# Patient Record
Sex: FEMALE | Race: BLACK OR AFRICAN AMERICAN | Employment: FULL TIME | URBAN - METROPOLITAN AREA
[De-identification: names, ages, dates, MRNs, and addresses within clinical notes are randomized per-mention and may not be internally consistent; named-entity substitution may affect disease eponyms.]

---

## 2017-01-11 ENCOUNTER — TELEPHONE (OUTPATIENT)
Dept: INTERNAL MEDICINE CLINIC | Facility: CLINIC | Age: 57
End: 2017-01-11

## 2017-01-11 RX ORDER — AZITHROMYCIN 250 MG/1
TABLET, FILM COATED ORAL
Qty: 6 TABLET | Refills: 0 | Status: SHIPPED | OUTPATIENT
Start: 2017-01-11 | End: 2017-11-11 | Stop reason: ALTCHOICE

## 2017-01-11 NOTE — TELEPHONE ENCOUNTER
Pt called in c/o sinus congestion, drainage with clear fluid and dry cough since Monday. Pt denies being light headed/dizzy/fever/ear or sinus pressure/ST/N/V/SOB or wheezing. Pt looking for Z-stefan. Allergies to ace inhibitors and beta blockers.  Usi

## 2017-01-11 NOTE — TELEPHONE ENCOUNTER
I spoke with  via telephone and provided him with pts s/s. Per TO from , RX pt z-stefan 250 mg take 2 tab day 1, then 1 tab daily for 4 days # 6. TORB. Prescription sent to pharmacy.

## 2017-01-20 RX ORDER — ASPIRIN 81 MG
TABLET, DELAYED RELEASE (ENTERIC COATED) ORAL
Qty: 90 TABLET | Refills: 1 | Status: SHIPPED | OUTPATIENT
Start: 2017-01-20 | End: 2017-07-15

## 2017-02-06 RX ORDER — METOPROLOL SUCCINATE 100 MG/1
TABLET, EXTENDED RELEASE ORAL
Qty: 90 TABLET | Refills: 0 | Status: SHIPPED | OUTPATIENT
Start: 2017-02-06 | End: 2017-05-04

## 2017-02-06 RX ORDER — AMLODIPINE BESYLATE 10 MG/1
TABLET ORAL
Qty: 90 TABLET | Refills: 0 | Status: SHIPPED | OUTPATIENT
Start: 2017-02-06 | End: 2017-05-04

## 2017-04-17 RX ORDER — LOSARTAN POTASSIUM 50 MG/1
TABLET ORAL
Qty: 180 TABLET | Refills: 0 | Status: SHIPPED | OUTPATIENT
Start: 2017-04-17 | End: 2017-07-15

## 2017-05-05 RX ORDER — METOPROLOL SUCCINATE 100 MG/1
TABLET, EXTENDED RELEASE ORAL
Qty: 90 TABLET | Refills: 0 | Status: SHIPPED | OUTPATIENT
Start: 2017-05-05 | End: 2017-08-05

## 2017-05-05 RX ORDER — AMLODIPINE BESYLATE 10 MG/1
TABLET ORAL
Qty: 90 TABLET | Refills: 0 | Status: SHIPPED | OUTPATIENT
Start: 2017-05-05 | End: 2017-08-05

## 2017-07-01 NOTE — TELEPHONE ENCOUNTER
Pt does not meet protocol due to    HgBA1C procedure resulted in past 6 months    Last HgBA1C < 7.5       Medication pending, ok to refill?

## 2017-07-17 RX ORDER — ASPIRIN 81 MG
TABLET, DELAYED RELEASE (ENTERIC COATED) ORAL
Qty: 90 TABLET | Refills: 0 | Status: SHIPPED | OUTPATIENT
Start: 2017-07-17 | End: 2017-12-05

## 2017-07-17 RX ORDER — LOSARTAN POTASSIUM 50 MG/1
TABLET ORAL
Qty: 60 TABLET | Refills: 0 | Status: SHIPPED | OUTPATIENT
Start: 2017-07-17 | End: 2017-07-17

## 2017-07-17 RX ORDER — ATORVASTATIN CALCIUM 20 MG/1
TABLET, FILM COATED ORAL
Qty: 90 TABLET | Refills: 0 | Status: SHIPPED | OUTPATIENT
Start: 2017-07-17 | End: 2017-11-16

## 2017-07-17 RX ORDER — LOSARTAN POTASSIUM 50 MG/1
TABLET ORAL
Qty: 180 TABLET | Refills: 0 | Status: SHIPPED | OUTPATIENT
Start: 2017-07-17 | End: 2017-10-05

## 2017-07-17 NOTE — TELEPHONE ENCOUNTER
Medication doesn't fall under protocol. Approve if needed.       Last OV 12/3/16    Last refill 1/20/17 , 90 tablets 1 refill

## 2017-07-17 NOTE — TELEPHONE ENCOUNTER
Pt has no current number to reach pt for appt. Pt does need appt for refill per protocol.      Last OV: 12/3/16

## 2017-08-07 RX ORDER — METOPROLOL SUCCINATE 100 MG/1
TABLET, EXTENDED RELEASE ORAL
Qty: 90 TABLET | Refills: 0 | Status: SHIPPED | OUTPATIENT
Start: 2017-08-07 | End: 2017-11-04

## 2017-08-07 RX ORDER — AMLODIPINE BESYLATE 10 MG/1
TABLET ORAL
Qty: 90 TABLET | Refills: 0 | Status: SHIPPED | OUTPATIENT
Start: 2017-08-07 | End: 2017-11-04

## 2017-10-07 RX ORDER — LOSARTAN POTASSIUM 50 MG/1
TABLET ORAL
Qty: 180 TABLET | Refills: 0 | Status: SHIPPED | OUTPATIENT
Start: 2017-10-07 | End: 2018-01-26

## 2017-10-12 ENCOUNTER — TELEPHONE (OUTPATIENT)
Dept: INTERNAL MEDICINE CLINIC | Facility: CLINIC | Age: 57
End: 2017-10-12

## 2017-11-07 RX ORDER — METOPROLOL SUCCINATE 100 MG/1
TABLET, EXTENDED RELEASE ORAL
Qty: 30 TABLET | Refills: 0 | Status: SHIPPED | OUTPATIENT
Start: 2017-11-07 | End: 2017-11-11

## 2017-11-07 RX ORDER — AMLODIPINE BESYLATE 10 MG/1
TABLET ORAL
Qty: 30 TABLET | Refills: 0 | Status: SHIPPED | OUTPATIENT
Start: 2017-11-07 | End: 2017-11-11

## 2017-11-07 NOTE — TELEPHONE ENCOUNTER
Requesting:Amlopipine Besylate 10 mg oral Tab, Metoprolol Succinate  MG Oral Tablet 24 Hr  LOV: 12/03/2016  RTC: No future appointments.   Last Relevant Labs: 12/03/16  Filled:   METOPROLOL SUCCINATE  MG Oral Tablet 24 Hr 90 tablet 0 8/7/2017

## 2017-11-09 RX ORDER — METOPROLOL SUCCINATE 100 MG/1
TABLET, EXTENDED RELEASE ORAL
Qty: 90 TABLET | Refills: 0 | OUTPATIENT
Start: 2017-11-09

## 2017-11-09 RX ORDER — AMLODIPINE BESYLATE 10 MG/1
TABLET ORAL
Qty: 90 TABLET | Refills: 0 | OUTPATIENT
Start: 2017-11-09

## 2017-11-09 NOTE — TELEPHONE ENCOUNTER
Requesting   LOV: 12/03/2016  RTC: 11/11/2016  Last Relevant Labs: 12/3/17  Filled:    AmLODIPine Besylate 10 MG Oral Tab 30 tablet 0 11/7/2017     Metoprolol Succinate  MG Oral Tablet 24 Hr 30 tablet 0 11/7/2017         Future Appointments  Date Time

## 2017-11-11 ENCOUNTER — OFFICE VISIT (OUTPATIENT)
Dept: INTERNAL MEDICINE CLINIC | Facility: CLINIC | Age: 57
End: 2017-11-11

## 2017-11-11 VITALS
WEIGHT: 180 LBS | BODY MASS INDEX: 29 KG/M2 | OXYGEN SATURATION: 97 % | DIASTOLIC BLOOD PRESSURE: 86 MMHG | HEART RATE: 68 BPM | SYSTOLIC BLOOD PRESSURE: 144 MMHG | TEMPERATURE: 99 F | RESPIRATION RATE: 16 BRPM

## 2017-11-11 DIAGNOSIS — Z00.00 LABORATORY EXAMINATION ORDERED AS PART OF A ROUTINE GENERAL MEDICAL EXAMINATION: ICD-10-CM

## 2017-11-11 DIAGNOSIS — I10 ESSENTIAL HYPERTENSION, BENIGN: Primary | Chronic | ICD-10-CM

## 2017-11-11 PROCEDURE — 90686 IIV4 VACC NO PRSV 0.5 ML IM: CPT | Performed by: INTERNAL MEDICINE

## 2017-11-11 PROCEDURE — 90471 IMMUNIZATION ADMIN: CPT | Performed by: INTERNAL MEDICINE

## 2017-11-11 PROCEDURE — 99213 OFFICE O/P EST LOW 20 MIN: CPT | Performed by: INTERNAL MEDICINE

## 2017-11-11 RX ORDER — AMLODIPINE BESYLATE 10 MG/1
TABLET ORAL
Qty: 90 TABLET | Refills: 1 | Status: SHIPPED | OUTPATIENT
Start: 2017-11-11 | End: 2018-06-08

## 2017-11-11 RX ORDER — METOPROLOL SUCCINATE 100 MG/1
TABLET, EXTENDED RELEASE ORAL
Qty: 90 TABLET | Refills: 1 | Status: SHIPPED | OUTPATIENT
Start: 2017-11-11 | End: 2018-06-08

## 2017-11-17 RX ORDER — ATORVASTATIN CALCIUM 20 MG/1
TABLET, FILM COATED ORAL
Qty: 90 TABLET | Refills: 0 | Status: SHIPPED | OUTPATIENT
Start: 2017-11-17 | End: 2018-03-03

## 2017-12-07 RX ORDER — ASPIRIN 81 MG
TABLET, DELAYED RELEASE (ENTERIC COATED) ORAL
Qty: 90 TABLET | Refills: 0 | Status: SHIPPED | OUTPATIENT
Start: 2017-12-07 | End: 2018-04-04

## 2017-12-07 NOTE — TELEPHONE ENCOUNTER
Medication(s) to Refill:   Pending Prescriptions Disp Refills    ASPIRIN EC LOW DOSE 81 MG Oral Tab EC [Pharmacy Med Name: ASPIRIN 81MG EC DR LOW DOSE TABLETS] 90 tablet 0     Sig: TAKE 1 TABLET BY MOUTH EVERY DAY           Last Time Medication was Filled:

## 2018-01-04 ENCOUNTER — TELEPHONE (OUTPATIENT)
Dept: INTERNAL MEDICINE CLINIC | Facility: CLINIC | Age: 58
End: 2018-01-04

## 2018-01-04 NOTE — TELEPHONE ENCOUNTER
Notified that she will need to be seen for eval.    Future Appointments  Date Time Provider Alexis To   1/5/2018 11:00 AM Tamara Alan MD [facility] [place]

## 2018-01-04 NOTE — TELEPHONE ENCOUNTER
Patient calling in requesting a refill for Cyclobenzaprine HCl (CYCLOBENZAPRINE) 5 MG Oral Tab. Patient has been experiencing back pain. Patient was also wondering she would be able to get a refill for [employer] as well.

## 2018-01-31 RX ORDER — LOSARTAN POTASSIUM 50 MG/1
TABLET ORAL
Qty: 180 TABLET | Refills: 0 | Status: SHIPPED | OUTPATIENT
Start: 2018-01-31 | End: 2018-04-30

## 2018-02-03 ENCOUNTER — OFFICE VISIT (OUTPATIENT)
Dept: INTERNAL MEDICINE CLINIC | Facility: CLINIC | Age: 58
End: 2018-02-03

## 2018-02-03 VITALS
TEMPERATURE: 99 F | RESPIRATION RATE: 16 BRPM | OXYGEN SATURATION: 95 % | HEART RATE: 74 BPM | DIASTOLIC BLOOD PRESSURE: 102 MMHG | SYSTOLIC BLOOD PRESSURE: 148 MMHG

## 2018-02-03 DIAGNOSIS — M54.41 CHRONIC RIGHT-SIDED LOW BACK PAIN WITH RIGHT-SIDED SCIATICA: ICD-10-CM

## 2018-02-03 DIAGNOSIS — E11.9 TYPE 2 DIABETES MELLITUS WITHOUT COMPLICATION, WITHOUT LONG-TERM CURRENT USE OF INSULIN (HCC): Chronic | ICD-10-CM

## 2018-02-03 DIAGNOSIS — Z00.00 LABORATORY EXAMINATION ORDERED AS PART OF A ROUTINE GENERAL MEDICAL EXAMINATION: ICD-10-CM

## 2018-02-03 DIAGNOSIS — G89.29 CHRONIC RIGHT-SIDED LOW BACK PAIN WITH RIGHT-SIDED SCIATICA: Primary | ICD-10-CM

## 2018-02-03 DIAGNOSIS — G89.29 CHRONIC RIGHT-SIDED LOW BACK PAIN WITH RIGHT-SIDED SCIATICA: ICD-10-CM

## 2018-02-03 DIAGNOSIS — M54.41 CHRONIC RIGHT-SIDED LOW BACK PAIN WITH RIGHT-SIDED SCIATICA: Primary | ICD-10-CM

## 2018-02-03 LAB
ALBUMIN SERPL-MCNC: 4 G/DL (ref 3.5–4.8)
ALP LIVER SERPL-CCNC: 88 U/L (ref 46–118)
ALT SERPL-CCNC: 41 U/L (ref 14–54)
AST SERPL-CCNC: 26 U/L (ref 15–41)
BASOPHILS # BLD AUTO: 0.05 X10(3) UL (ref 0–0.1)
BASOPHILS NFR BLD AUTO: 0.9 %
BILIRUB SERPL-MCNC: 0.8 MG/DL (ref 0.1–2)
BILIRUB UR QL STRIP.AUTO: NEGATIVE
BUN BLD-MCNC: 10 MG/DL (ref 8–20)
CALCIUM BLD-MCNC: 9 MG/DL (ref 8.3–10.3)
CHLORIDE: 107 MMOL/L (ref 101–111)
CHOLEST SMN-MCNC: 126 MG/DL (ref ?–200)
CO2: 25 MMOL/L (ref 22–32)
COLOR UR AUTO: YELLOW
CREAT BLD-MCNC: 0.79 MG/DL (ref 0.55–1.02)
CREAT UR-SCNC: 196 MG/DL
EOSINOPHIL # BLD AUTO: 0.3 X10(3) UL (ref 0–0.3)
EOSINOPHIL NFR BLD AUTO: 5.5 %
ERYTHROCYTE [DISTWIDTH] IN BLOOD BY AUTOMATED COUNT: 13.8 % (ref 11.5–16)
EST. AVERAGE GLUCOSE BLD GHB EST-MCNC: 134 MG/DL (ref 68–126)
GLUCOSE BLD-MCNC: 103 MG/DL (ref 70–99)
GLUCOSE UR STRIP.AUTO-MCNC: NEGATIVE MG/DL
HBA1C MFR BLD HPLC: 6.3 % (ref ?–5.7)
HCT VFR BLD AUTO: 41.6 % (ref 34–50)
HDLC SERPL-MCNC: 63 MG/DL (ref 45–?)
HDLC SERPL: 2 {RATIO} (ref ?–4.44)
HGB BLD-MCNC: 14.1 G/DL (ref 12–16)
IMMATURE GRANULOCYTE COUNT: 0 X10(3) UL (ref 0–1)
IMMATURE GRANULOCYTE RATIO %: 0 %
KETONES UR STRIP.AUTO-MCNC: NEGATIVE MG/DL
LDLC SERPL CALC-MCNC: 54 MG/DL (ref ?–130)
LEUKOCYTE ESTERASE UR QL STRIP.AUTO: NEGATIVE
LYMPHOCYTES # BLD AUTO: 2.33 X10(3) UL (ref 0.9–4)
LYMPHOCYTES NFR BLD AUTO: 42.5 %
M PROTEIN MFR SERPL ELPH: 8 G/DL (ref 6.1–8.3)
MCH RBC QN AUTO: 30.8 PG (ref 27–33.2)
MCHC RBC AUTO-ENTMCNC: 33.9 G/DL (ref 31–37)
MCV RBC AUTO: 90.8 FL (ref 81–100)
MICROALBUMIN UR-MCNC: 2.12 MG/DL
MICROALBUMIN/CREAT 24H UR-RTO: 10.8 UG/MG (ref ?–30)
MONOCYTES # BLD AUTO: 0.62 X10(3) UL (ref 0.1–0.6)
MONOCYTES NFR BLD AUTO: 11.3 %
NEUTROPHIL ABS PRELIM: 2.18 X10 (3) UL (ref 1.3–6.7)
NEUTROPHILS # BLD AUTO: 2.18 X10(3) UL (ref 1.3–6.7)
NEUTROPHILS NFR BLD AUTO: 39.8 %
NITRITE UR QL STRIP.AUTO: NEGATIVE
NONHDLC SERPL-MCNC: 63 MG/DL (ref ?–130)
PH UR STRIP.AUTO: 6 [PH] (ref 4.5–8)
PLATELET # BLD AUTO: 269 10(3)UL (ref 150–450)
POTASSIUM SERPL-SCNC: 3.7 MMOL/L (ref 3.6–5.1)
PROT UR STRIP.AUTO-MCNC: NEGATIVE MG/DL
RBC # BLD AUTO: 4.58 X10(6)UL (ref 3.8–5.1)
RBC UR QL AUTO: NEGATIVE
RED CELL DISTRIBUTION WIDTH-SD: 46.3 FL (ref 35.1–46.3)
SODIUM SERPL-SCNC: 140 MMOL/L (ref 136–144)
SP GR UR STRIP.AUTO: 1.01 (ref 1–1.03)
THYROXINE (T4): 8.6 UG/DL (ref 4.5–10.9)
TRIGL SERPL-MCNC: 47 MG/DL (ref ?–150)
TSI SER-ACNC: 0.89 MIU/ML (ref 0.35–5.5)
UROBILINOGEN UR STRIP.AUTO-MCNC: <2 MG/DL
VLDLC SERPL CALC-MCNC: 9 MG/DL (ref 5–40)
WBC # BLD AUTO: 5.5 X10(3) UL (ref 4–13)

## 2018-02-03 PROCEDURE — 83036 HEMOGLOBIN GLYCOSYLATED A1C: CPT | Performed by: INTERNAL MEDICINE

## 2018-02-03 PROCEDURE — 81003 URINALYSIS AUTO W/O SCOPE: CPT | Performed by: INTERNAL MEDICINE

## 2018-02-03 PROCEDURE — 82570 ASSAY OF URINE CREATININE: CPT | Performed by: INTERNAL MEDICINE

## 2018-02-03 PROCEDURE — 80050 GENERAL HEALTH PANEL: CPT | Performed by: INTERNAL MEDICINE

## 2018-02-03 PROCEDURE — 80061 LIPID PANEL: CPT | Performed by: INTERNAL MEDICINE

## 2018-02-03 PROCEDURE — 84436 ASSAY OF TOTAL THYROXINE: CPT | Performed by: INTERNAL MEDICINE

## 2018-02-03 PROCEDURE — 82043 UR ALBUMIN QUANTITATIVE: CPT | Performed by: INTERNAL MEDICINE

## 2018-02-03 PROCEDURE — 99213 OFFICE O/P EST LOW 20 MIN: CPT | Performed by: INTERNAL MEDICINE

## 2018-02-03 RX ORDER — CYCLOBENZAPRINE HCL 5 MG
5 TABLET ORAL 3 TIMES DAILY PRN
Qty: 30 TABLET | Refills: 0 | Status: SHIPPED | OUTPATIENT
Start: 2018-02-03 | End: 2018-02-13

## 2018-02-03 NOTE — PROGRESS NOTES
Mahesh De Santiago  1960 is a 65year old female. Patient presents with:  Back Pain      HPI:   C/o of low back pain for 4 weeks. Patient bent forward and started having some intense pain in the right low back.   Saw chiropractor had x-rays and abou scale 5/10  Increased by movt        REVIEW OF SYSTEMS:     General/Constitutional:   General no fatigue, no fever, no weakness, no weight loss or gain. Gastrointestinal:   Reflux no. Abdominal pain no. Appetite change no. Black stools no. Bloating no.  B Sodium 50 MG Oral Tab EC; Take 1 tablet (50 mg total) by mouth 2 (two) times daily. -     Cyclobenzaprine HCl 5 MG Oral Tab; Take 1 tablet (5 mg total) by mouth 3 (three) times daily as needed for Muscle spasms.     Laboratory examination ordered as part o

## 2018-02-06 NOTE — TELEPHONE ENCOUNTER
Diclofenac 50 mg 1 tab bid filled 2-3-18 60 with 1 refill     LOV 2-3-18     Requesting a 90 day supply

## 2018-02-16 ENCOUNTER — TELEPHONE (OUTPATIENT)
Dept: INTERNAL MEDICINE CLINIC | Facility: CLINIC | Age: 58
End: 2018-02-16

## 2018-02-16 NOTE — TELEPHONE ENCOUNTER
Patient calling to request a note for work.  Patient has not gone to work in 3 days and is requesting a note from Dr. Jing Ervin

## 2018-02-16 NOTE — TELEPHONE ENCOUNTER
Looking for note excusing her from work this whole week d/t back pain. Stated that she has not been in 2/12/18 and will return 2/20/18. Ok to issue or will you need to see her?

## 2018-03-03 ENCOUNTER — OFFICE VISIT (OUTPATIENT)
Dept: INTERNAL MEDICINE CLINIC | Facility: CLINIC | Age: 58
End: 2018-03-03

## 2018-03-03 ENCOUNTER — HOSPITAL ENCOUNTER (OUTPATIENT)
Dept: MAMMOGRAPHY | Age: 58
Discharge: HOME | End: 2018-03-03
Attending: INTERNAL MEDICINE
Payer: COMMERCIAL

## 2018-03-03 VITALS
HEART RATE: 70 BPM | DIASTOLIC BLOOD PRESSURE: 80 MMHG | SYSTOLIC BLOOD PRESSURE: 136 MMHG | TEMPERATURE: 99 F | OXYGEN SATURATION: 93 % | HEIGHT: 66 IN | WEIGHT: 176.5 LBS | RESPIRATION RATE: 14 BRPM | BODY MASS INDEX: 28.37 KG/M2

## 2018-03-03 DIAGNOSIS — M54.50 CHRONIC MIDLINE LOW BACK PAIN WITHOUT SCIATICA: ICD-10-CM

## 2018-03-03 DIAGNOSIS — Z00.00 ROUTINE GENERAL MEDICAL EXAMINATION AT A HEALTH CARE FACILITY: Primary | ICD-10-CM

## 2018-03-03 DIAGNOSIS — Z12.31 ENCOUNTER FOR SCREENING MAMMOGRAM FOR MALIGNANT NEOPLASM OF BREAST: ICD-10-CM

## 2018-03-03 DIAGNOSIS — E11.9 TYPE 2 DIABETES MELLITUS WITHOUT COMPLICATION, WITHOUT LONG-TERM CURRENT USE OF INSULIN (HCC): Chronic | ICD-10-CM

## 2018-03-03 DIAGNOSIS — G89.29 CHRONIC MIDLINE LOW BACK PAIN WITHOUT SCIATICA: ICD-10-CM

## 2018-03-03 PROCEDURE — 99396 PREV VISIT EST AGE 40-64: CPT | Performed by: INTERNAL MEDICINE

## 2018-03-03 PROCEDURE — 93000 ELECTROCARDIOGRAM COMPLETE: CPT | Performed by: INTERNAL MEDICINE

## 2018-03-03 PROCEDURE — 77067 SCR MAMMO BI INCL CAD: CPT | Performed by: INTERNAL MEDICINE

## 2018-03-03 NOTE — PROGRESS NOTES
Tuan Charles  1960 is a 65year old female.     Patient presents with:  Physical: Est Pt. complete physical      HPI:   See below     Current Outpatient Prescriptions:  DICLOFENAC SODIUM 50 MG Oral Tab EC TAKE 1 TABLET(50 MG) BY MOUTH TWICE DAILY hoarseness. Neck no lumps, no goiter, no neck stiffness or pain. Endocrine:   Diabetes yes . Thyroid disorder none. Respiratory:   Patient denies chest pain, cough, WILL (dyspnea on exertion), chest congestion, blood-tinged sputum/wheezing.  Breathing no Turbinates: normal.   NECK:   Carotid bruit: none. Cervical lymph nodes: unremarkable. JVD: none. Range of Motion: normal.   Thyroid: unremarkable. HEART:   Clicks: absent . Edema: none visible . Heart sounds: normal.   Murmurs: none.    Rhyth smear results from gynecologist     The patient indicates understanding of these issues and agrees to the plan. The patient is asked to Return in about 4 weeks (around 3/31/2018) for result discussion.   Delia Soliman MD

## 2018-03-05 RX ORDER — ATORVASTATIN CALCIUM 20 MG/1
TABLET, FILM COATED ORAL
Qty: 90 TABLET | Refills: 0 | Status: SHIPPED | OUTPATIENT
Start: 2018-03-05 | End: 2018-06-08

## 2018-03-07 ENCOUNTER — TELEPHONE (OUTPATIENT)
Dept: INTERNAL MEDICINE CLINIC | Facility: CLINIC | Age: 58
End: 2018-03-07

## 2018-03-13 ENCOUNTER — OFFICE VISIT (OUTPATIENT)
Dept: INTERNAL MEDICINE CLINIC | Facility: CLINIC | Age: 58
End: 2018-03-13

## 2018-03-13 VITALS
HEART RATE: 90 BPM | TEMPERATURE: 100 F | SYSTOLIC BLOOD PRESSURE: 138 MMHG | OXYGEN SATURATION: 95 % | RESPIRATION RATE: 16 BRPM | DIASTOLIC BLOOD PRESSURE: 80 MMHG

## 2018-03-13 DIAGNOSIS — R05.9 COUGH: Primary | ICD-10-CM

## 2018-03-13 PROCEDURE — 99213 OFFICE O/P EST LOW 20 MIN: CPT | Performed by: INTERNAL MEDICINE

## 2018-03-13 RX ORDER — CODEINE PHOSPHATE AND GUAIFENESIN 10; 100 MG/5ML; MG/5ML
5 SOLUTION ORAL EVERY 6 HOURS PRN
Qty: 240 ML | Refills: 0 | Status: SHIPPED | OUTPATIENT
Start: 2018-03-13 | End: 2018-03-23

## 2018-03-13 RX ORDER — LEVOFLOXACIN 500 MG/1
500 TABLET, FILM COATED ORAL DAILY
Qty: 10 TABLET | Refills: 0 | Status: SHIPPED | OUTPATIENT
Start: 2018-03-13 | End: 2018-03-23

## 2018-03-13 RX ORDER — PREDNISONE 1 MG/1
TABLET ORAL
Qty: 60 TABLET | Refills: 0 | Status: SHIPPED | OUTPATIENT
Start: 2018-03-13 | End: 2018-04-17 | Stop reason: ALTCHOICE

## 2018-03-13 NOTE — PROGRESS NOTES
Daniella Ramirez  1960 is a 65year old female who presents for upper respiratory symptoms    Patient presents with:  Flu        HPI:   Pt reports  respiratory symptoms for few days nonproductive cough with wheezing.        Current Outpatient Prescrip fever  SKIN: no rashes  EYES:denies eye pain,discharge   HEENTneg  LUNGS: denies shortness of breath,chest pain,wheezing  CARDIOVASCULAR: denies chest pain on exertion  GI: no nausea or abdominal pain  NEURO: denies headaches    EXAM:   /80   Pulse 9

## 2018-03-24 ENCOUNTER — OFFICE VISIT (OUTPATIENT)
Dept: INTERNAL MEDICINE CLINIC | Facility: CLINIC | Age: 58
End: 2018-03-24

## 2018-03-24 VITALS
SYSTOLIC BLOOD PRESSURE: 152 MMHG | BODY MASS INDEX: 28 KG/M2 | RESPIRATION RATE: 16 BRPM | HEART RATE: 74 BPM | TEMPERATURE: 99 F | DIASTOLIC BLOOD PRESSURE: 98 MMHG | OXYGEN SATURATION: 98 % | WEIGHT: 171 LBS

## 2018-03-24 DIAGNOSIS — R05.9 COUGH: Primary | ICD-10-CM

## 2018-03-24 PROCEDURE — 99213 OFFICE O/P EST LOW 20 MIN: CPT | Performed by: INTERNAL MEDICINE

## 2018-03-24 NOTE — PROGRESS NOTES
Umm Median  1960 is a 65year old female who presents for upper respiratory symptoms    Patient presents with:   Follow - Up: sickness  Back Pain        HPI:   Better not 100% still has residual cough and a sore back from coughing      Current O breath,chest pain,wheezing  CARDIOVASCULAR: denies chest pain on exertion  GI: no nausea or abdominal pain  NEURO: denies headaches    EXAM:   /98   Pulse 74   Temp 98.7 °F (37.1 °C) (Oral)   Resp 16   Wt 171 lb   LMP 06/12/2015   SpO2 98%   BMI 27. 6

## 2018-04-05 RX ORDER — ASPIRIN 81 MG
TABLET, DELAYED RELEASE (ENTERIC COATED) ORAL
Qty: 90 TABLET | Refills: 0 | Status: SHIPPED | OUTPATIENT
Start: 2018-04-05 | End: 2018-12-29

## 2018-04-05 NOTE — TELEPHONE ENCOUNTER
ASPIRIN EC LOW DOSE 81 MG Oral Tab EC 90 tablet 0 12/7/2017    Sig :  TAKE 1 TABLET BY MOUTH EVERY DAY

## 2018-04-17 ENCOUNTER — OFFICE VISIT (OUTPATIENT)
Dept: INTERNAL MEDICINE CLINIC | Facility: CLINIC | Age: 58
End: 2018-04-17

## 2018-04-17 VITALS
OXYGEN SATURATION: 99 % | RESPIRATION RATE: 16 BRPM | SYSTOLIC BLOOD PRESSURE: 152 MMHG | DIASTOLIC BLOOD PRESSURE: 108 MMHG | HEART RATE: 78 BPM

## 2018-04-17 DIAGNOSIS — M54.41 CHRONIC RIGHT-SIDED LOW BACK PAIN WITH RIGHT-SIDED SCIATICA: ICD-10-CM

## 2018-04-17 DIAGNOSIS — G89.29 CHRONIC RIGHT-SIDED LOW BACK PAIN WITH RIGHT-SIDED SCIATICA: ICD-10-CM

## 2018-04-17 PROCEDURE — 99213 OFFICE O/P EST LOW 20 MIN: CPT | Performed by: INTERNAL MEDICINE

## 2018-04-17 RX ORDER — METHYLPREDNISOLONE 4 MG/1
TABLET ORAL
Qty: 1 KIT | Refills: 0 | Status: SHIPPED | OUTPATIENT
Start: 2018-04-17 | End: 2018-12-29 | Stop reason: ALTCHOICE

## 2018-04-17 NOTE — PROGRESS NOTES
Fadi Jernigan  1960 is a 65year old female. Patient presents with:  Pain: Starting at right hip down her leg      HPI:   C/o of low back pain for 4 weeks. Patient bent forward and started having some intense pain in the right low back.   Saw tristan Previous injury none. Previous surgery none. Previous therapy none. Bowel and bladder incontinence none.    Pain scale 5/10  Increased by movt    Saw chiropractor no relief no wants to proceed further    REVIEW OF SYSTEMS:     General/Constitutional for pain.     Diagnoses and all orders for this visit:    Chronic right-sided low back pain with right-sided sciatica  -     Diclofenac Sodium 50 MG Oral Tab EC; TAKE 1 TABLET(50 MG) BY MOUTH TWICE DAILY  -     methylPREDNISolone (MEDROL) 4 MG Oral Tablet T

## 2018-05-01 RX ORDER — LOSARTAN POTASSIUM 50 MG/1
TABLET ORAL
Qty: 180 TABLET | Refills: 0 | Status: SHIPPED | OUTPATIENT
Start: 2018-05-01 | End: 2018-08-16

## 2018-05-07 ENCOUNTER — TELEPHONE (OUTPATIENT)
Dept: INTERNAL MEDICINE CLINIC | Facility: CLINIC | Age: 58
End: 2018-05-07

## 2018-05-08 ENCOUNTER — TELEPHONE (OUTPATIENT)
Dept: INTERNAL MEDICINE CLINIC | Facility: CLINIC | Age: 58
End: 2018-05-08

## 2018-05-08 NOTE — TELEPHONE ENCOUNTER
Patient informed of orders, patient verbalized understanding but in a great deal of pain, would you squeeze her in your schedule today?

## 2018-05-08 NOTE — TELEPHONE ENCOUNTER
Let patient know that she has significant amount of spondylosis in the back   No evidence of disc herniation.   Patient to see me for further recommendations and description

## 2018-05-10 ENCOUNTER — OFFICE VISIT (OUTPATIENT)
Dept: INTERNAL MEDICINE CLINIC | Facility: CLINIC | Age: 58
End: 2018-05-10

## 2018-05-10 VITALS
RESPIRATION RATE: 14 BRPM | HEIGHT: 66 IN | HEART RATE: 76 BPM | DIASTOLIC BLOOD PRESSURE: 80 MMHG | WEIGHT: 170 LBS | BODY MASS INDEX: 27.32 KG/M2 | OXYGEN SATURATION: 98 % | SYSTOLIC BLOOD PRESSURE: 126 MMHG

## 2018-05-10 DIAGNOSIS — M47.26 OSTEOARTHRITIS OF SPINE WITH RADICULOPATHY, LUMBAR REGION: Primary | ICD-10-CM

## 2018-05-10 DIAGNOSIS — D25.9 UTERINE LEIOMYOMA, UNSPECIFIED LOCATION: ICD-10-CM

## 2018-05-10 PROCEDURE — 99213 OFFICE O/P EST LOW 20 MIN: CPT | Performed by: INTERNAL MEDICINE

## 2018-05-10 RX ORDER — TRAMADOL HYDROCHLORIDE 50 MG/1
50 TABLET ORAL EVERY 6 HOURS PRN
Qty: 50 TABLET | Refills: 0 | Status: SHIPPED | OUTPATIENT
Start: 2018-05-10 | End: 2018-05-15 | Stop reason: SINTOL

## 2018-05-10 NOTE — PATIENT INSTRUCTIONS
Patient informed that she may eventually need a pain consult however will leave that to the discretion of the spine MD

## 2018-05-10 NOTE — PROGRESS NOTES
Liz Fox  1960 is a 65year old female. No chief complaint on file.       For MRI results still continues to have back pain  Relief with medicines and exercise-none    Current Outpatient Prescriptions:  TraMADol HCl 50 MG Oral Tab Take 1 ta kg/m²     na      ASSESSMENT AND PLAN:   Diagnoses and all orders for this visit:    Osteoarthritis of spine with radiculopathy, lumbar region  -     ORTHOPEDIC - EXTERNAL  -     TraMADol HCl 50 MG Oral Tab;  Take 1 tablet (50 mg total) by mouth every 6 (si

## 2018-05-14 ENCOUNTER — TELEPHONE (OUTPATIENT)
Dept: INTERNAL MEDICINE CLINIC | Facility: CLINIC | Age: 58
End: 2018-05-14

## 2018-05-14 DIAGNOSIS — M54.41 CHRONIC RIGHT-SIDED LOW BACK PAIN WITH RIGHT-SIDED SCIATICA: ICD-10-CM

## 2018-05-14 DIAGNOSIS — M54.2 NECK PAIN: ICD-10-CM

## 2018-05-14 DIAGNOSIS — G89.29 CHRONIC RIGHT-SIDED LOW BACK PAIN WITH RIGHT-SIDED SCIATICA: ICD-10-CM

## 2018-05-14 RX ORDER — ETODOLAC 400 MG/1
400 TABLET, FILM COATED ORAL 2 TIMES DAILY
Qty: 60 TABLET | Refills: 0 | Status: CANCELLED | OUTPATIENT
Start: 2018-05-14 | End: 2018-05-29

## 2018-05-14 NOTE — TELEPHONE ENCOUNTER
Pt called w c/o on side effects to Tramadol 50mg, stated had vomiting, dizziness and sleepiness.  Pt also informed has upcoming ov w spine specialist for next week and requested a refill on Etodolac 400mg as this medication has worked for her on the past.

## 2018-05-30 PROBLEM — G89.29 CHRONIC BILATERAL LOW BACK PAIN WITH RIGHT-SIDED SCIATICA: Status: ACTIVE | Noted: 2018-05-30

## 2018-05-30 PROBLEM — M54.41 CHRONIC BILATERAL LOW BACK PAIN WITH RIGHT-SIDED SCIATICA: Status: ACTIVE | Noted: 2018-05-30

## 2018-06-08 RX ORDER — METOPROLOL SUCCINATE 100 MG/1
TABLET, EXTENDED RELEASE ORAL
Qty: 90 TABLET | Refills: 0 | Status: SHIPPED | OUTPATIENT
Start: 2018-06-08 | End: 2018-08-16

## 2018-06-08 RX ORDER — AMLODIPINE BESYLATE 10 MG/1
TABLET ORAL
Qty: 90 TABLET | Refills: 0 | Status: SHIPPED | OUTPATIENT
Start: 2018-06-08 | End: 2018-08-16

## 2018-06-08 RX ORDER — ATORVASTATIN CALCIUM 20 MG/1
TABLET, FILM COATED ORAL
Qty: 90 TABLET | Refills: 0 | Status: SHIPPED | OUTPATIENT
Start: 2018-06-08 | End: 2018-08-16

## 2018-08-15 DIAGNOSIS — E11.9 TYPE 2 DIABETES MELLITUS WITHOUT COMPLICATION, WITHOUT LONG-TERM CURRENT USE OF INSULIN (HCC): Primary | ICD-10-CM

## 2018-08-16 RX ORDER — METOPROLOL SUCCINATE 100 MG/1
100 TABLET, EXTENDED RELEASE ORAL
Qty: 90 TABLET | Refills: 0 | Status: SHIPPED | OUTPATIENT
Start: 2018-08-16 | End: 2018-12-20

## 2018-08-16 RX ORDER — AMLODIPINE BESYLATE 10 MG/1
10 TABLET ORAL
Qty: 90 TABLET | Refills: 0 | Status: SHIPPED | OUTPATIENT
Start: 2018-08-16 | End: 2018-12-20

## 2018-08-16 RX ORDER — ATORVASTATIN CALCIUM 20 MG/1
TABLET, FILM COATED ORAL
Qty: 90 TABLET | Refills: 0 | Status: SHIPPED | OUTPATIENT
Start: 2018-08-16 | End: 2018-12-29

## 2018-08-16 RX ORDER — LOSARTAN POTASSIUM 50 MG/1
100 TABLET ORAL
Qty: 180 TABLET | Refills: 0 | Status: SHIPPED | OUTPATIENT
Start: 2018-08-16 | End: 2018-12-29

## 2018-08-16 RX ORDER — ASPIRIN 81 MG
TABLET, DELAYED RELEASE (ENTERIC COATED) ORAL
Qty: 90 TABLET | Refills: 0 | Status: SHIPPED | OUTPATIENT
Start: 2018-08-16 | End: 2018-12-29

## 2018-08-16 NOTE — TELEPHONE ENCOUNTER
Medication(s) to Refill:   Pending Prescriptions Disp Refills    Metoprolol Succinate  MG Oral Tablet 24 Hr 90 tablet 0     Sig: Take 1 tablet (100 mg total) by mouth once daily.       Losartan Potassium 50 MG Oral Tab 180 tablet 0     Sig: Take 2 tab    Very high:         > or = 190 mg/dL      Resulting Agency  [facility]      Specimen Collected: 02/03/18 12:57 PM Last Resulted: 02/03/18  9:37 PM                Notes Recorded by Sara Louis MD on 2/5/2018 at 9:35 AM CST  Results in see me for cpx

## 2018-08-16 NOTE — TELEPHONE ENCOUNTER
Protocol failed - Patient due for HgBA1C - Orders pended and VM has been left for patient to contact office.        Medication(s) to Refill:   Pending Prescriptions Disp Refills    METFORMIN  MG Oral Tab [Pharmacy Med Name: METFORMIN 500MG TABLETS] 1

## 2018-09-24 ENCOUNTER — TELEPHONE (OUTPATIENT)
Dept: INTERNAL MEDICINE CLINIC | Facility: CLINIC | Age: 58
End: 2018-09-24

## 2018-10-15 ENCOUNTER — TELEPHONE (OUTPATIENT)
Dept: INTERNAL MEDICINE CLINIC | Facility: CLINIC | Age: 58
End: 2018-10-15

## 2018-10-15 NOTE — TELEPHONE ENCOUNTER
Spoke with Jemma Hardy - she is ok to do nurse visit/lab draw if available and if ok per Harsh Zamudio. Spoke with Harsh Zamudio - she is going to make sure all charges needed are done but is ok with lab draw at nurse visit. Notified Jemma Hardy.   Attempted to contact pt to disc

## 2018-10-15 NOTE — TELEPHONE ENCOUNTER
Patient coming in for OV tomorrow for nurse visit - flu shot. Requested 5:30 appt due to work schedule. Patient wondering if possible to get blood work done also as other lab hours do not work with her schedule.

## 2018-10-16 ENCOUNTER — TELEPHONE (OUTPATIENT)
Dept: INTERNAL MEDICINE CLINIC | Facility: CLINIC | Age: 58
End: 2018-10-16

## 2018-10-16 ENCOUNTER — IMMUNIZATION (OUTPATIENT)
Dept: INTERNAL MEDICINE CLINIC | Facility: CLINIC | Age: 58
End: 2018-10-16
Payer: COMMERCIAL

## 2018-10-16 DIAGNOSIS — Z23 NEED FOR VACCINATION: ICD-10-CM

## 2018-10-16 DIAGNOSIS — E11.9 TYPE 2 DIABETES MELLITUS WITHOUT COMPLICATION, WITHOUT LONG-TERM CURRENT USE OF INSULIN (HCC): Primary | ICD-10-CM

## 2018-10-16 PROCEDURE — 90686 IIV4 VACC NO PRSV 0.5 ML IM: CPT | Performed by: INTERNAL MEDICINE

## 2018-10-16 PROCEDURE — 90471 IMMUNIZATION ADMIN: CPT | Performed by: INTERNAL MEDICINE

## 2018-10-16 NOTE — TELEPHONE ENCOUNTER
Patient has Hemoglobin A1C lab ordered. Please advise if you would like pt to have any other labs checked when she comes in for lab draw.

## 2018-10-16 NOTE — TELEPHONE ENCOUNTER
Patient calling in inquiring about the lab work that she needs to get done. Pt is insure of which other labs she is needing. Nurse Kallie Avina will check with Physician and call pt back.

## 2018-11-20 ENCOUNTER — TELEPHONE (OUTPATIENT)
Dept: INTERNAL MEDICINE CLINIC | Facility: CLINIC | Age: 58
End: 2018-11-20

## 2018-11-20 RX ORDER — CODEINE PHOSPHATE AND GUAIFENESIN 10; 100 MG/5ML; MG/5ML
5 SOLUTION ORAL EVERY 6 HOURS PRN
Qty: 240 ML | Refills: 0 | Status: SHIPPED | OUTPATIENT
Start: 2018-11-20 | End: 2018-11-30

## 2018-11-20 RX ORDER — AMOXICILLIN AND CLAVULANATE POTASSIUM 500; 125 MG/1; MG/1
1 TABLET, FILM COATED ORAL 2 TIMES DAILY
Qty: 20 TABLET | Refills: 0 | Status: SHIPPED | OUTPATIENT
Start: 2018-11-20 | End: 2018-11-30

## 2018-11-20 NOTE — TELEPHONE ENCOUNTER
Patient calling in requesting a prescription for her symptoms of runny nose, cough, sore throat. Pt stated she is unable to make it in for an appointment. Please call pt to discuss symptoms.

## 2018-12-07 NOTE — TELEPHONE ENCOUNTER
Refill requested:   Requested Prescriptions     Pending Prescriptions Disp Refills   • METFORMIN  MG Oral Tab [Pharmacy Med Name: METFORMIN 500MG TABLETS] 180 tablet 0     Sig: TAKE 1 TABLET BY MOUTH TWICE DAILY   • AMLODIPINE BESYLATE 10 MG Oral Ta

## 2018-12-09 RX ORDER — METOPROLOL SUCCINATE 100 MG/1
TABLET, EXTENDED RELEASE ORAL
Qty: 90 TABLET | Refills: 0 | OUTPATIENT
Start: 2018-12-09

## 2018-12-09 RX ORDER — AMLODIPINE BESYLATE 10 MG/1
TABLET ORAL
Qty: 90 TABLET | Refills: 0 | OUTPATIENT
Start: 2018-12-09

## 2018-12-15 ENCOUNTER — TELEPHONE (OUTPATIENT)
Dept: INTERNAL MEDICINE CLINIC | Facility: CLINIC | Age: 58
End: 2018-12-15

## 2018-12-15 NOTE — TELEPHONE ENCOUNTER
Received call from pharmacy -- meds denied last week bc pt is due for labs & OV. Pt states she did blood work this morning. Approved 3 days of meds -- needs appt.

## 2018-12-20 RX ORDER — AMLODIPINE BESYLATE 10 MG/1
10 TABLET ORAL
Qty: 90 TABLET | Refills: 0 | Status: SHIPPED | OUTPATIENT
Start: 2018-12-20 | End: 2019-03-17

## 2018-12-20 RX ORDER — METOPROLOL SUCCINATE 100 MG/1
100 TABLET, EXTENDED RELEASE ORAL
Qty: 90 TABLET | Refills: 0 | Status: SHIPPED | OUTPATIENT
Start: 2018-12-20 | End: 2019-03-17

## 2018-12-20 NOTE — TELEPHONE ENCOUNTER
Patient calling in seeking her refill to be authorized. Pt scheduled an appointment on 12/29/18. Pt also completed her blood work, awaiting a call for her results.

## 2018-12-29 ENCOUNTER — OFFICE VISIT (OUTPATIENT)
Dept: INTERNAL MEDICINE CLINIC | Facility: CLINIC | Age: 58
End: 2018-12-29
Payer: COMMERCIAL

## 2018-12-29 VITALS
WEIGHT: 172.25 LBS | RESPIRATION RATE: 16 BRPM | BODY MASS INDEX: 28.35 KG/M2 | OXYGEN SATURATION: 97 % | DIASTOLIC BLOOD PRESSURE: 84 MMHG | TEMPERATURE: 98 F | HEIGHT: 65.5 IN | SYSTOLIC BLOOD PRESSURE: 130 MMHG | HEART RATE: 80 BPM

## 2018-12-29 DIAGNOSIS — R05.9 COUGH: Primary | ICD-10-CM

## 2018-12-29 PROCEDURE — 99213 OFFICE O/P EST LOW 20 MIN: CPT | Performed by: INTERNAL MEDICINE

## 2018-12-29 RX ORDER — LOSARTAN POTASSIUM 50 MG/1
100 TABLET ORAL DAILY
Qty: 180 TABLET | Refills: 0 | Status: SHIPPED | OUTPATIENT
Start: 2018-12-29 | End: 2019-04-13

## 2018-12-29 RX ORDER — ASPIRIN 81 MG/1
81 TABLET ORAL DAILY
Qty: 90 TABLET | Refills: 1 | Status: SHIPPED | OUTPATIENT
Start: 2018-12-29 | End: 2019-07-11

## 2018-12-29 RX ORDER — ALBUTEROL SULFATE 90 UG/1
2 AEROSOL, METERED RESPIRATORY (INHALATION) EVERY 6 HOURS PRN
Qty: 2 INHALER | Refills: 0 | Status: SHIPPED | OUTPATIENT
Start: 2018-12-29 | End: 2019-01-28

## 2018-12-29 RX ORDER — PREDNISONE 1 MG/1
TABLET ORAL
Qty: 36 TABLET | Refills: 0 | Status: SHIPPED | OUTPATIENT
Start: 2018-12-29 | End: 2019-01-14 | Stop reason: ALTCHOICE

## 2018-12-29 RX ORDER — ATORVASTATIN CALCIUM 20 MG/1
20 TABLET, FILM COATED ORAL NIGHTLY
Qty: 90 TABLET | Refills: 1 | Status: SHIPPED | OUTPATIENT
Start: 2018-12-29 | End: 2019-04-13

## 2018-12-29 RX ORDER — AZITHROMYCIN 250 MG/1
TABLET, FILM COATED ORAL
Qty: 6 TABLET | Refills: 0 | Status: SHIPPED | OUTPATIENT
Start: 2018-12-29 | End: 2019-01-14 | Stop reason: ALTCHOICE

## 2018-12-29 NOTE — PROGRESS NOTES
Javier Mendoza  1960 is a 63year old female who presents for upper respiratory symptoms    Patient presents with:  Medication Follow-Up  Cough    Here for blood pressure med refills per protocol.     HPI:   Pt reports  respiratory symptoms for prod otherwise. Denies fever  SKIN: no rashes  EYES:denies eye pain,discharge   HEENT:neg  LUNGS: denies shortness of breath,cough,expectoration,chest pain,wheezing  CARDIOVASCULAR: denies chest pain on exertion  GI: no nausea or abdominal pain  NEURO: denies he 6/29/2019). Inessa Funk MD

## 2019-01-01 NOTE — PROGRESS NOTES
Farhat Mccarthy  1960 is a 65year old female. Patient presents with: Follow - Up: Medication       HPI:   Received a phone call for refill of medicines  Patient has no complaints    Current Outpatient Prescriptions:   AmLODIPine Besylate 10 MG O Parents returned to room. Infant out to mom's room.    Nasal septum: midline. Pharynx: normal.   Sinuses: non-tender. HEART:   Clicks: no.   Distal Pulses Palpable: yes. Edema: none visible . Gallop: no .   Heart sounds: normal S1S2. Murmurs: none. Rhythm: regular.    LUNGS:   Airflow: normal air

## 2019-01-04 ENCOUNTER — TELEPHONE (OUTPATIENT)
Dept: INTERNAL MEDICINE CLINIC | Facility: CLINIC | Age: 59
End: 2019-01-04

## 2019-01-04 NOTE — TELEPHONE ENCOUNTER
Spoke with pharmacist and they stated prednisone taper that was sent over left out the 3 tabs for 2 days but it was instructed to the pt to do 3 tabs for 2 days so pt will be short 6 tablets.  Pharmacist is wanting provider to give clarification on what pt

## 2019-01-04 NOTE — TELEPHONE ENCOUNTER
Pharmacy would like a call back states that they gave the pt different instructions on medication from what the PCP sent over

## 2019-01-07 ENCOUNTER — TELEPHONE (OUTPATIENT)
Dept: INTERNAL MEDICINE CLINIC | Facility: CLINIC | Age: 59
End: 2019-01-07

## 2019-01-07 NOTE — TELEPHONE ENCOUNTER
Left message for patient to call office back.    -Pt willing to come in for OV or requesting additional medication?

## 2019-01-07 NOTE — TELEPHONE ENCOUNTER
Patient was in for OV on 12/29 for cough and symptoms have not improved. Solomon August \"Rattle in throat\" is ongoing, cough is no better.  Please advise

## 2019-01-07 NOTE — TELEPHONE ENCOUNTER
Notified pt of provider response. Attempted to schedule pt but she stated she needed something after 5 - offered OV tomorrow at 5pm. Pt stated that wouldn't work. Offered pt OV with another provider. Pt not agreeable.  Pt insistent on getting medication and

## 2019-01-07 NOTE — TELEPHONE ENCOUNTER
Pt stated that her symptoms are still lingering. Pt states she has a cough/wheeze which seems to get worse at night. Pt is taking her cheratussin q 6 hours and states it is helping with the cough at the time but not getting rid of it.  Pt also stated that s

## 2019-01-08 RX ORDER — AZITHROMYCIN 250 MG/1
TABLET, FILM COATED ORAL
Qty: 6 TABLET | Refills: 0 | Status: SHIPPED | OUTPATIENT
Start: 2019-01-08 | End: 2019-01-14 | Stop reason: ALTCHOICE

## 2019-01-14 ENCOUNTER — OFFICE VISIT (OUTPATIENT)
Dept: INTERNAL MEDICINE CLINIC | Facility: CLINIC | Age: 59
End: 2019-01-14
Payer: COMMERCIAL

## 2019-01-14 VITALS
WEIGHT: 176.25 LBS | RESPIRATION RATE: 16 BRPM | SYSTOLIC BLOOD PRESSURE: 144 MMHG | TEMPERATURE: 99 F | HEART RATE: 77 BPM | DIASTOLIC BLOOD PRESSURE: 84 MMHG | BODY MASS INDEX: 29.01 KG/M2 | HEIGHT: 65.5 IN | OXYGEN SATURATION: 96 %

## 2019-01-14 DIAGNOSIS — R05.9 COUGH: Primary | ICD-10-CM

## 2019-01-14 NOTE — PROGRESS NOTES
Fadi Jernigan  1960 is a 63year old female.     Patient presents with:  Cough      HPI:   Feels better wants to cancel the visit    Current Outpatient Medications:  aspirin (ASPIRIN EC LOW DOSE) 81 MG Oral Tab EC Take 1 tablet (81 mg total) by sergio the plan. The patient is asked to No Follow-up on file.   Laurent Arguello MD

## 2019-03-18 NOTE — TELEPHONE ENCOUNTER
Protocol failed - CMP or BMP in past 12 months    Medication(s) to Refill:   Requested Prescriptions     Pending Prescriptions Disp Refills   • AMLODIPINE BESYLATE 10 MG Oral Tab [Pharmacy Med Name: AMLODIPINE BESYLATE 10MG TABLETS] 90 tablet 0     Sig: TA

## 2019-03-19 ENCOUNTER — TELEPHONE (OUTPATIENT)
Dept: INTERNAL MEDICINE CLINIC | Facility: CLINIC | Age: 59
End: 2019-03-19

## 2019-03-19 DIAGNOSIS — Z00.00 LABORATORY EXAMINATION ORDERED AS PART OF A ROUTINE GENERAL MEDICAL EXAMINATION: Primary | ICD-10-CM

## 2019-03-19 DIAGNOSIS — E11.9 TYPE 2 DIABETES MELLITUS WITHOUT COMPLICATION, WITHOUT LONG-TERM CURRENT USE OF INSULIN (HCC): Chronic | ICD-10-CM

## 2019-03-19 DIAGNOSIS — I10 ESSENTIAL HYPERTENSION, BENIGN: Chronic | ICD-10-CM

## 2019-03-19 DIAGNOSIS — E78.00 PURE HYPERCHOLESTEROLEMIA: Chronic | ICD-10-CM

## 2019-03-19 RX ORDER — AMLODIPINE BESYLATE 10 MG/1
TABLET ORAL
Qty: 30 TABLET | Refills: 0 | Status: SHIPPED | OUTPATIENT
Start: 2019-03-19 | End: 2019-04-13

## 2019-03-19 RX ORDER — METOPROLOL SUCCINATE 100 MG/1
TABLET, EXTENDED RELEASE ORAL
Qty: 30 TABLET | Refills: 0 | Status: SHIPPED | OUTPATIENT
Start: 2019-03-19 | End: 2019-04-13

## 2019-03-19 NOTE — TELEPHONE ENCOUNTER
Future Appointments   Date Time Provider Alexis Zuleika   4/13/2019 10:00 AM Otf Briones MD [facility] [place]     Medication sent to pharmacy with enough to get patient to appointment.

## 2019-03-19 NOTE — TELEPHONE ENCOUNTER
Refill requested:   Requested Prescriptions     Pending Prescriptions Disp Refills   • Metoprolol Succinate  MG Oral Tablet 24 Hr 90 tablet 0     Sig: Take 1 tablet (100 mg total) by mouth once daily.    • amLODIPine Besylate 10 MG Oral Tab 90 tablet

## 2019-03-19 NOTE — TELEPHONE ENCOUNTER
First available Saturday CPX scheduled     Future Appointments   Date Time Provider Alexis Yadavi   4/13/2019 10:00 AM Virgil Contreras MD [facility]       Pt requests temporary supply of medication until visit:    Metoprolol    Amlodipine    Wa

## 2019-03-20 RX ORDER — METOPROLOL SUCCINATE 100 MG/1
100 TABLET, EXTENDED RELEASE ORAL
Qty: 90 TABLET | Refills: 0 | Status: SHIPPED | OUTPATIENT
Start: 2019-03-20 | End: 2019-03-20

## 2019-03-20 RX ORDER — AMLODIPINE BESYLATE 10 MG/1
10 TABLET ORAL
Qty: 90 TABLET | Refills: 0 | Status: SHIPPED | OUTPATIENT
Start: 2019-03-20 | End: 2019-03-20

## 2019-03-20 NOTE — TELEPHONE ENCOUNTER
Called pt to notify of provider request. No answer. LVM for pt to call office back.  -Fasting lab orders entered through [employer] and pt needs to go for them as well as schedule cpx.

## 2019-03-20 NOTE — TELEPHONE ENCOUNTER
Pt called back. Notified her of provider instructions. Pt verbalized understanding and stated she did not want to go to [address] [place] [address] stated [employer] is not in her insurance anymore. Lab orders updated and pt's preferred lab updated.  Med list updated and pharmacy

## 2019-04-02 NOTE — TELEPHONE ENCOUNTER
Metformin 500 mg 1 tab bid filled 12/20/18 180 with 0 refills     LOV 3/3/18     Return in about 4 weeks (around 3/31/2018) for result discussion.     Next apt 4/13/19    Labs 12/15/18    HEMOGLOBIN A1c <5.7 % of total Hgb 5.8 Abnormally high

## 2019-04-13 ENCOUNTER — OFFICE VISIT (OUTPATIENT)
Dept: INTERNAL MEDICINE CLINIC | Facility: CLINIC | Age: 59
End: 2019-04-13
Payer: COMMERCIAL

## 2019-04-13 VITALS
WEIGHT: 180.5 LBS | HEART RATE: 79 BPM | SYSTOLIC BLOOD PRESSURE: 132 MMHG | DIASTOLIC BLOOD PRESSURE: 88 MMHG | TEMPERATURE: 98 F | OXYGEN SATURATION: 98 % | RESPIRATION RATE: 12 BRPM | BODY MASS INDEX: 29.71 KG/M2 | HEIGHT: 65.5 IN

## 2019-04-13 DIAGNOSIS — E11.9 TYPE 2 DIABETES MELLITUS WITHOUT COMPLICATION, WITHOUT LONG-TERM CURRENT USE OF INSULIN (HCC): Chronic | ICD-10-CM

## 2019-04-13 DIAGNOSIS — Z00.00 LABORATORY EXAMINATION ORDERED AS PART OF A ROUTINE GENERAL MEDICAL EXAMINATION: ICD-10-CM

## 2019-04-13 DIAGNOSIS — I10 ESSENTIAL HYPERTENSION, BENIGN: Chronic | ICD-10-CM

## 2019-04-13 DIAGNOSIS — E78.00 PURE HYPERCHOLESTEROLEMIA: Chronic | ICD-10-CM

## 2019-04-13 DIAGNOSIS — M54.41 CHRONIC BILATERAL LOW BACK PAIN WITH RIGHT-SIDED SCIATICA: ICD-10-CM

## 2019-04-13 DIAGNOSIS — G89.29 CHRONIC BILATERAL LOW BACK PAIN WITH RIGHT-SIDED SCIATICA: ICD-10-CM

## 2019-04-13 DIAGNOSIS — Z00.00 ROUTINE GENERAL MEDICAL EXAMINATION AT A HEALTH CARE FACILITY: Primary | ICD-10-CM

## 2019-04-13 PROCEDURE — 82570 ASSAY OF URINE CREATININE: CPT | Performed by: INTERNAL MEDICINE

## 2019-04-13 PROCEDURE — 93000 ELECTROCARDIOGRAM COMPLETE: CPT | Performed by: INTERNAL MEDICINE

## 2019-04-13 PROCEDURE — 82043 UR ALBUMIN QUANTITATIVE: CPT | Performed by: INTERNAL MEDICINE

## 2019-04-13 PROCEDURE — 82306 VITAMIN D 25 HYDROXY: CPT | Performed by: INTERNAL MEDICINE

## 2019-04-13 PROCEDURE — 84436 ASSAY OF TOTAL THYROXINE: CPT | Performed by: INTERNAL MEDICINE

## 2019-04-13 PROCEDURE — 85025 COMPLETE CBC W/AUTO DIFF WBC: CPT | Performed by: INTERNAL MEDICINE

## 2019-04-13 PROCEDURE — 80061 LIPID PANEL: CPT | Performed by: INTERNAL MEDICINE

## 2019-04-13 PROCEDURE — 99396 PREV VISIT EST AGE 40-64: CPT | Performed by: INTERNAL MEDICINE

## 2019-04-13 PROCEDURE — 80053 COMPREHEN METABOLIC PANEL: CPT | Performed by: INTERNAL MEDICINE

## 2019-04-13 PROCEDURE — 83036 HEMOGLOBIN GLYCOSYLATED A1C: CPT | Performed by: INTERNAL MEDICINE

## 2019-04-13 PROCEDURE — 81003 URINALYSIS AUTO W/O SCOPE: CPT | Performed by: INTERNAL MEDICINE

## 2019-04-13 RX ORDER — LOSARTAN POTASSIUM 50 MG/1
100 TABLET ORAL DAILY
Qty: 180 TABLET | Refills: 1 | Status: SHIPPED | OUTPATIENT
Start: 2019-04-13 | End: 2019-10-26

## 2019-04-13 RX ORDER — AMLODIPINE BESYLATE 10 MG/1
TABLET ORAL
Qty: 90 TABLET | Refills: 1 | Status: SHIPPED | OUTPATIENT
Start: 2019-04-13 | End: 2019-10-13

## 2019-04-13 RX ORDER — METOPROLOL SUCCINATE 100 MG/1
TABLET, EXTENDED RELEASE ORAL
Qty: 90 TABLET | Refills: 1 | Status: SHIPPED | OUTPATIENT
Start: 2019-04-13 | End: 2019-10-13

## 2019-04-13 RX ORDER — ATORVASTATIN CALCIUM 20 MG/1
20 TABLET, FILM COATED ORAL NIGHTLY
Qty: 90 TABLET | Refills: 1 | Status: SHIPPED | OUTPATIENT
Start: 2019-04-13 | End: 2020-01-31

## 2019-04-13 NOTE — PROGRESS NOTES
Heather [facility] 1960 is a 63year old female.     Patient presents with:  Physical      HPI:   See below     Current Outpatient Medications:  amLODIPine Besylate 10 MG Oral Tab TAKE 1 TABLET(10 MG) BY MOUTH EVERY DAY Disp: 90 tablet Rfl: 1   Metopro Diabetes yes . Thyroid disorder none. Respiratory:   Patient denies chest pain, cough, WILL (dyspnea on exertion), chest congestion, blood-tinged sputum/wheezing. Breathing normal pattern .    Cardiovascular:   Patient denies chest pain, shortness of rolando unremarkable. JVD: none. Range of Motion: normal.   Thyroid: unremarkable. HEART:   Clicks: absent . Edema: none visible . Heart sounds: normal.   Murmurs: none. Rhythm: regular. LUNGS:   Auscultation: clear .    Chest Shape: normal .   Percus Succinate  MG Oral Tablet 24 Hr; TAKE 1 TABLET(100 MG) BY MOUTH EVERY DAY  -     Losartan Potassium 50 MG Oral Tab; Take 2 tablets (100 mg total) by mouth daily. -     atorvastatin 20 MG Oral Tab; Take 1 tablet (20 mg total) by mouth nightly.     EKG

## 2019-04-16 ENCOUNTER — TELEPHONE (OUTPATIENT)
Dept: INTERNAL MEDICINE CLINIC | Facility: CLINIC | Age: 59
End: 2019-04-16

## 2019-04-16 RX ORDER — ERGOCALCIFEROL 1.25 MG/1
50000 CAPSULE ORAL WEEKLY
Qty: 12 CAPSULE | Refills: 0 | Status: SHIPPED | OUTPATIENT
Start: 2019-04-16 | End: 2020-02-25 | Stop reason: ALTCHOICE

## 2019-04-16 NOTE — TELEPHONE ENCOUNTER
Notes recorded by NANCY Uriostegui on 4/16/2019 at 10:50 AM CDT  Vit D still low normal. Send 50,000 IU weekly x 12 weeks no refills. Then 2000 IU OTC VIt D3 daily.  DM still controlled but worse than previous, please encourage patient to watch diet

## 2019-06-10 ENCOUNTER — TELEPHONE (OUTPATIENT)
Dept: INTERNAL MEDICINE CLINIC | Facility: CLINIC | Age: 59
End: 2019-06-10

## 2019-06-10 DIAGNOSIS — G89.29 CHRONIC BILATERAL LOW BACK PAIN WITH RIGHT-SIDED SCIATICA: Primary | Chronic | ICD-10-CM

## 2019-06-10 DIAGNOSIS — M54.41 CHRONIC BILATERAL LOW BACK PAIN WITH RIGHT-SIDED SCIATICA: Primary | Chronic | ICD-10-CM

## 2019-06-10 RX ORDER — METHYLPREDNISOLONE 4 MG/1
TABLET ORAL
Qty: 1 KIT | Refills: 0 | Status: SHIPPED | OUTPATIENT
Start: 2019-06-10 | End: 2019-10-26 | Stop reason: ALTCHOICE

## 2019-06-10 NOTE — TELEPHONE ENCOUNTER
Referral pending for approval. Please advise. Pt also stating that her back pain is \"really bad\" right now. Pt stated she has tried the diclofenac and it works but [employer] so [employer]" to work.  Advised pt that no oral medication is going to have instant

## 2019-07-11 DIAGNOSIS — E11.9 TYPE 2 DIABETES MELLITUS WITHOUT COMPLICATION, WITHOUT LONG-TERM CURRENT USE OF INSULIN (HCC): Primary | Chronic | ICD-10-CM

## 2019-07-12 RX ORDER — ASPIRIN 81 MG/1
TABLET, COATED ORAL
Qty: 90 TABLET | Refills: 0 | Status: SHIPPED | OUTPATIENT
Start: 2019-07-12 | End: 2019-10-13

## 2019-07-12 NOTE — TELEPHONE ENCOUNTER
Protocol passed.      Medication(s) to Refill:   Requested Prescriptions     Pending Prescriptions Disp Refills   • ASPIRIN LOW DOSE 81 MG Oral Tab EC [Pharmacy Med Name: ASPIRIN 81MG EC LOW DOSE  TABLETS] 90 tablet 0     Sig: TAKE 1 TABLET BY MOUTH DAILY

## 2019-09-17 ENCOUNTER — HOSPITAL ENCOUNTER (OUTPATIENT)
Dept: MAMMOGRAPHY | Age: 59
Discharge: HOME | End: 2019-09-17
Attending: INTERNAL MEDICINE
Payer: COMMERCIAL

## 2019-09-17 DIAGNOSIS — Z00.00 ROUTINE GENERAL MEDICAL EXAMINATION AT A HEALTH CARE FACILITY: ICD-10-CM

## 2019-09-17 PROCEDURE — 77067 SCR MAMMO BI INCL CAD: CPT | Performed by: INTERNAL MEDICINE

## 2019-09-17 PROCEDURE — 77063 BREAST TOMOSYNTHESIS BI: CPT | Performed by: INTERNAL MEDICINE

## 2019-10-13 DIAGNOSIS — E11.9 TYPE 2 DIABETES MELLITUS WITHOUT COMPLICATION, WITHOUT LONG-TERM CURRENT USE OF INSULIN (HCC): Chronic | ICD-10-CM

## 2019-10-15 NOTE — TELEPHONE ENCOUNTER
Protocol failed - labs and appointment needed - LVM for pt to contact office to schedule appointment for BP follow up.      Medication(s) to Refill:   Requested Prescriptions     Pending Prescriptions Disp Refills   • ASPIRIN LOW DOSE 81 MG Oral Tab EC [Pha

## 2019-10-16 RX ORDER — METOPROLOL SUCCINATE 100 MG/1
TABLET, EXTENDED RELEASE ORAL
Qty: 30 TABLET | Refills: 0 | Status: SHIPPED | OUTPATIENT
Start: 2019-10-16 | End: 2019-10-26

## 2019-10-16 RX ORDER — ASPIRIN 81 MG/1
TABLET, COATED ORAL
Qty: 30 TABLET | Refills: 0 | Status: SHIPPED | OUTPATIENT
Start: 2019-10-16 | End: 2019-11-18

## 2019-10-16 RX ORDER — AMLODIPINE BESYLATE 10 MG/1
TABLET ORAL
Qty: 30 TABLET | Refills: 0 | Status: SHIPPED | OUTPATIENT
Start: 2019-10-16 | End: 2019-10-26

## 2019-10-16 NOTE — TELEPHONE ENCOUNTER
Patient called back and scheduled BP follow up for 10/26/19 at 12:45 p.m. with Dr. Melissa Packer. Patient is inquiring if she can get a temporary rx until she comes in for her appointment. Please call back to inform if this would be approved by Dr. Melissa Packer.

## 2019-10-16 NOTE — TELEPHONE ENCOUNTER
Pt is scheduled for an appt. On 10-26-19 with Dr. Regla Lawson. She is requesting short supply of medications. Gave only 30 day supply.

## 2019-10-22 RX ORDER — LOSARTAN POTASSIUM 50 MG/1
TABLET ORAL
Qty: 180 TABLET | Refills: 0 | Status: SHIPPED | OUTPATIENT
Start: 2019-10-22 | End: 2020-01-31

## 2019-10-22 RX ORDER — LOSARTAN POTASSIUM 50 MG/1
TABLET ORAL
Qty: 180 TABLET | Refills: 0 | OUTPATIENT
Start: 2019-10-22

## 2019-10-22 RX ORDER — ERGOCALCIFEROL 1.25 MG/1
CAPSULE ORAL
Qty: 12 CAPSULE | Refills: 0 | OUTPATIENT
Start: 2019-10-22

## 2019-10-22 NOTE — TELEPHONE ENCOUNTER
Passed protocol    Medication(s) to Refill:   Requested Prescriptions     Pending Prescriptions Disp Refills   • LOSARTAN POTASSIUM 50 MG Oral Tab [Pharmacy Med Name: LOSARTAN 50MG TABLETS] 180 tablet 0     Sig: TAKE 2 TABLETS(100 MG) BY MOUTH DAILY   • LO

## 2019-10-26 ENCOUNTER — OFFICE VISIT (OUTPATIENT)
Dept: INTERNAL MEDICINE CLINIC | Facility: CLINIC | Age: 59
End: 2019-10-26
Payer: COMMERCIAL

## 2019-10-26 VITALS
BODY MASS INDEX: 30.99 KG/M2 | HEART RATE: 78 BPM | TEMPERATURE: 98 F | WEIGHT: 188.25 LBS | HEIGHT: 65.5 IN | DIASTOLIC BLOOD PRESSURE: 78 MMHG | SYSTOLIC BLOOD PRESSURE: 120 MMHG

## 2019-10-26 DIAGNOSIS — E78.00 PURE HYPERCHOLESTEROLEMIA: Chronic | ICD-10-CM

## 2019-10-26 DIAGNOSIS — I10 ESSENTIAL HYPERTENSION, BENIGN: Primary | Chronic | ICD-10-CM

## 2019-10-26 DIAGNOSIS — E11.9 TYPE 2 DIABETES MELLITUS WITHOUT COMPLICATION, WITHOUT LONG-TERM CURRENT USE OF INSULIN (HCC): Chronic | ICD-10-CM

## 2019-10-26 PROCEDURE — 99213 OFFICE O/P EST LOW 20 MIN: CPT | Performed by: INTERNAL MEDICINE

## 2019-10-26 PROCEDURE — 90471 IMMUNIZATION ADMIN: CPT | Performed by: INTERNAL MEDICINE

## 2019-10-26 PROCEDURE — 90732 PPSV23 VACC 2 YRS+ SUBQ/IM: CPT | Performed by: INTERNAL MEDICINE

## 2019-10-26 RX ORDER — METOPROLOL SUCCINATE 100 MG/1
TABLET, EXTENDED RELEASE ORAL
Qty: 90 TABLET | Refills: 1 | Status: SHIPPED | OUTPATIENT
Start: 2019-10-26 | End: 2020-05-08

## 2019-10-26 RX ORDER — AMLODIPINE BESYLATE 10 MG/1
TABLET ORAL
Qty: 90 TABLET | Refills: 1 | Status: SHIPPED | OUTPATIENT
Start: 2019-10-26 | End: 2020-05-08

## 2019-10-26 NOTE — PROGRESS NOTES
Janak Matias  1960 is a 63year old female. Patient presents with:   Follow - Up: b/p       HPI:   To check no complaints  metFORMIN HCl 500 MG Oral Tab, TAKE 1 TABLET(500 MG) BY MOUTH TWICE DAILY, Disp: 180 tablet, Rfl: 1  amLODIPine Besylate Nasal septum: midline. Pharynx: normal.   Sinuses: non-tender. HEART:   Clicks: no.   Distal Pulses Palpable: yes. Edema: none visible . Gallop: no .   Heart sounds: normal S1S2. Murmurs: none. Rhythm: regular.    LUNGS:   Airflow: normal air

## 2019-11-12 ENCOUNTER — APPOINTMENT (OUTPATIENT)
Dept: LAB | Age: 59
End: 2019-11-12
Attending: INTERNAL MEDICINE
Payer: COMMERCIAL

## 2019-11-12 DIAGNOSIS — E11.9 TYPE 2 DIABETES MELLITUS WITHOUT COMPLICATION, WITHOUT LONG-TERM CURRENT USE OF INSULIN (HCC): Chronic | ICD-10-CM

## 2019-11-12 DIAGNOSIS — I10 ESSENTIAL HYPERTENSION, BENIGN: Chronic | ICD-10-CM

## 2019-11-12 DIAGNOSIS — E78.00 PURE HYPERCHOLESTEROLEMIA: Chronic | ICD-10-CM

## 2019-11-12 PROCEDURE — 36415 COLL VENOUS BLD VENIPUNCTURE: CPT | Performed by: INTERNAL MEDICINE

## 2019-11-12 PROCEDURE — 83036 HEMOGLOBIN GLYCOSYLATED A1C: CPT | Performed by: INTERNAL MEDICINE

## 2019-11-12 PROCEDURE — 80053 COMPREHEN METABOLIC PANEL: CPT | Performed by: INTERNAL MEDICINE

## 2019-11-12 PROCEDURE — 80061 LIPID PANEL: CPT | Performed by: INTERNAL MEDICINE

## 2019-11-18 RX ORDER — ASPIRIN 81 MG/1
TABLET, COATED ORAL
Qty: 30 TABLET | Refills: 0 | Status: CANCELLED | OUTPATIENT
Start: 2019-11-18

## 2019-11-19 RX ORDER — ASPIRIN 81 MG/1
TABLET, COATED ORAL
Qty: 30 TABLET | Refills: 0 | Status: SHIPPED | OUTPATIENT
Start: 2019-11-19 | End: 2020-02-03

## 2019-11-19 NOTE — TELEPHONE ENCOUNTER
Aspirin low 81 mg 1 tab daily filled 10/16/19 30 with 0 refills     LOV 10/26/19   Return in about 6 months (around 4/26/2020). .  No upcoming apt on file   Labs 11/12/19

## 2020-01-31 RX ORDER — ATORVASTATIN CALCIUM 20 MG/1
TABLET, FILM COATED ORAL
Qty: 90 TABLET | Refills: 1 | Status: SHIPPED | OUTPATIENT
Start: 2020-01-31 | End: 2020-08-11

## 2020-01-31 RX ORDER — LOSARTAN POTASSIUM 50 MG/1
TABLET ORAL
Qty: 180 TABLET | Refills: 0 | Status: SHIPPED | OUTPATIENT
Start: 2020-01-31 | End: 2020-05-08

## 2020-01-31 NOTE — TELEPHONE ENCOUNTER
Passed protocol    Requesting atorvastatin 20 MG Oral Tab  LOV: 10/26/19  RTC: 6 months  Last Relevant Labs: 11/12/19  Filled: 4/13/19 #90 with 1 refills    Requesting LOSARTAN POTASSIUM 50 MG Oral Tab  Filled: 10/22/19 #180 with 0 refills    No future suma

## 2020-02-03 RX ORDER — ASPIRIN 81 MG/1
TABLET, COATED ORAL
Qty: 30 TABLET | Refills: 0 | Status: SHIPPED | OUTPATIENT
Start: 2020-02-03 | End: 2020-04-03

## 2020-02-03 NOTE — TELEPHONE ENCOUNTER
No protocol    Requesting ASPIRIN LOW DOSE 81 MG Oral Tab EC  LOV: 10/26/19  RTC: 6 months  Last Relevant Labs: 11/12/19  Filled: 11/19/19 #30 with 0 refills    No future appointments.

## 2020-02-07 ENCOUNTER — TELEPHONE (OUTPATIENT)
Dept: INTERNAL MEDICINE CLINIC | Facility: CLINIC | Age: 60
End: 2020-02-07

## 2020-02-07 DIAGNOSIS — G89.29 CHRONIC BILATERAL LOW BACK PAIN WITH RIGHT-SIDED SCIATICA: Primary | Chronic | ICD-10-CM

## 2020-02-07 DIAGNOSIS — M54.41 CHRONIC BILATERAL LOW BACK PAIN WITH RIGHT-SIDED SCIATICA: Primary | Chronic | ICD-10-CM

## 2020-02-07 NOTE — TELEPHONE ENCOUNTER
Patient calling for an order to [address] [facility] for physical therapy in [place].  Lower back, states shes had it before and would like a call back letting her know

## 2020-02-07 NOTE — TELEPHONE ENCOUNTER
LOV 10/26/2019.     Referral pending for approval - please advise if willing to sign off or if pt needs to come in for OV first. TY

## 2020-02-12 ENCOUNTER — TELEPHONE (OUTPATIENT)
Dept: INTERNAL MEDICINE CLINIC | Facility: CLINIC | Age: 60
End: 2020-02-12

## 2020-02-12 NOTE — TELEPHONE ENCOUNTER
Patient called requesting note to be excused from work. From 2/10-2/13. Patient was referred to PT for ongoing back pain and feels no improvement.  Patient states that she has seen Dr. Ambar Craft for this issue before LOV 10/26/19

## 2020-02-13 NOTE — TELEPHONE ENCOUNTER
Note has been printed and signed - pt informed that letter is ready for  - pt expressed understanding.

## 2020-02-25 ENCOUNTER — OFFICE VISIT (OUTPATIENT)
Dept: INTERNAL MEDICINE CLINIC | Facility: CLINIC | Age: 60
End: 2020-02-25
Payer: COMMERCIAL

## 2020-02-25 ENCOUNTER — TELEPHONE (OUTPATIENT)
Dept: INTERNAL MEDICINE CLINIC | Facility: CLINIC | Age: 60
End: 2020-02-25

## 2020-02-25 VITALS
OXYGEN SATURATION: 98 % | TEMPERATURE: 99 F | WEIGHT: 186 LBS | BODY MASS INDEX: 30.62 KG/M2 | RESPIRATION RATE: 18 BRPM | DIASTOLIC BLOOD PRESSURE: 76 MMHG | SYSTOLIC BLOOD PRESSURE: 142 MMHG | HEIGHT: 65.5 IN | HEART RATE: 65 BPM

## 2020-02-25 DIAGNOSIS — M54.41 CHRONIC BILATERAL LOW BACK PAIN WITH RIGHT-SIDED SCIATICA: Primary | ICD-10-CM

## 2020-02-25 DIAGNOSIS — G89.29 CHRONIC BILATERAL LOW BACK PAIN WITH RIGHT-SIDED SCIATICA: Primary | ICD-10-CM

## 2020-02-25 PROCEDURE — 99213 OFFICE O/P EST LOW 20 MIN: CPT | Performed by: INTERNAL MEDICINE

## 2020-02-25 NOTE — PROGRESS NOTES
Drew Wright  1960 is a 63year old female. Patient presents with:  Complete Form: FMLA      HPI:   Patient went for physical therapy in early part of February and here for FMLA form to be filled out.   Current Outpatient Medications   Medicati

## 2020-02-25 NOTE — TELEPHONE ENCOUNTER
Patient here for OV for [facility] . Forms complete by Dr. Keyon Castaneda. Copy given to patient. Copy sent to scanning. Original placed in purple accordion file by [employer] pod. Faxed copy to [phone]    Awaiting confirmation of fax.

## 2020-04-03 RX ORDER — ASPIRIN 81 MG/1
TABLET, COATED ORAL
Qty: 30 TABLET | Refills: 0 | Status: SHIPPED | OUTPATIENT
Start: 2020-04-03

## 2020-05-08 RX ORDER — LOSARTAN POTASSIUM 50 MG/1
TABLET ORAL
Qty: 180 TABLET | Refills: 0 | Status: SHIPPED | OUTPATIENT
Start: 2020-05-08 | End: 2020-08-18

## 2020-05-08 RX ORDER — AMLODIPINE BESYLATE 10 MG/1
TABLET ORAL
Qty: 90 TABLET | Refills: 1 | Status: SHIPPED | OUTPATIENT
Start: 2020-05-08 | End: 2020-12-01

## 2020-05-08 RX ORDER — METOPROLOL SUCCINATE 100 MG/1
TABLET, EXTENDED RELEASE ORAL
Qty: 90 TABLET | Refills: 1 | Status: SHIPPED | OUTPATIENT
Start: 2020-05-08 | End: 2020-12-01

## 2020-05-31 DIAGNOSIS — E11.9 TYPE 2 DIABETES MELLITUS WITHOUT COMPLICATION, WITHOUT LONG-TERM CURRENT USE OF INSULIN (HCC): Chronic | ICD-10-CM

## 2020-06-01 NOTE — TELEPHONE ENCOUNTER
Refill requested:   Requested Prescriptions     Pending Prescriptions Disp Refills   • METFORMIN  MG Oral Tab [Pharmacy Med Name: METFORMIN 500MG TABLETS] 180 tablet 1     Sig: TAKE 1 TABLET(500 MG) BY MOUTH TWICE DAILY       Failed protocol    Last

## 2020-07-18 ENCOUNTER — OFFICE VISIT (OUTPATIENT)
Dept: INTERNAL MEDICINE CLINIC | Facility: CLINIC | Age: 60
End: 2020-07-18
Payer: COMMERCIAL

## 2020-07-18 VITALS
OXYGEN SATURATION: 97 % | DIASTOLIC BLOOD PRESSURE: 64 MMHG | TEMPERATURE: 98 F | BODY MASS INDEX: 29.14 KG/M2 | SYSTOLIC BLOOD PRESSURE: 124 MMHG | RESPIRATION RATE: 16 BRPM | WEIGHT: 177 LBS | HEIGHT: 65.5 IN | HEART RATE: 70 BPM

## 2020-07-18 DIAGNOSIS — G89.29 CHRONIC BILATERAL LOW BACK PAIN WITH RIGHT-SIDED SCIATICA: Primary | Chronic | ICD-10-CM

## 2020-07-18 DIAGNOSIS — M54.41 CHRONIC BILATERAL LOW BACK PAIN WITH RIGHT-SIDED SCIATICA: Primary | Chronic | ICD-10-CM

## 2020-07-18 DIAGNOSIS — Z00.00 LABORATORY EXAMINATION ORDERED AS PART OF A ROUTINE GENERAL MEDICAL EXAMINATION: ICD-10-CM

## 2020-07-18 LAB
ALBUMIN SERPL-MCNC: 3.9 G/DL (ref 3.4–5)
ALBUMIN/GLOB SERPL: 1 {RATIO} (ref 1–2)
ALP LIVER SERPL-CCNC: 97 U/L (ref 46–118)
ALT SERPL-CCNC: 36 U/L (ref 13–56)
ANION GAP SERPL CALC-SCNC: 6 MMOL/L (ref 0–18)
AST SERPL-CCNC: 23 U/L (ref 15–37)
BASOPHILS # BLD AUTO: 0.05 X10(3) UL (ref 0–0.2)
BASOPHILS NFR BLD AUTO: 0.9 %
BILIRUB SERPL-MCNC: 0.5 MG/DL (ref 0.1–2)
BILIRUB UR QL STRIP.AUTO: NEGATIVE
BUN BLD-MCNC: 12 MG/DL (ref 7–18)
BUN/CREAT SERPL: 12.2 (ref 10–20)
CALCIUM BLD-MCNC: 9.1 MG/DL (ref 8.5–10.1)
CHLORIDE SERPL-SCNC: 106 MMOL/L (ref 98–112)
CHOLEST SMN-MCNC: 136 MG/DL (ref ?–200)
CO2 SERPL-SCNC: 27 MMOL/L (ref 21–32)
COLOR UR AUTO: YELLOW
CREAT BLD-MCNC: 0.98 MG/DL (ref 0.55–1.02)
CREAT UR-SCNC: 258 MG/DL
DEPRECATED RDW RBC AUTO: 52.4 FL (ref 35.1–46.3)
EOSINOPHIL # BLD AUTO: 0.29 X10(3) UL (ref 0–0.7)
EOSINOPHIL NFR BLD AUTO: 5.1 %
ERYTHROCYTE [DISTWIDTH] IN BLOOD BY AUTOMATED COUNT: 14.6 % (ref 11–15)
EST. AVERAGE GLUCOSE BLD GHB EST-MCNC: 126 MG/DL (ref 68–126)
GLOBULIN PLAS-MCNC: 4 G/DL (ref 2.8–4.4)
GLUCOSE BLD-MCNC: 94 MG/DL (ref 70–99)
GLUCOSE UR STRIP.AUTO-MCNC: NEGATIVE MG/DL
HBA1C MFR BLD HPLC: 6 % (ref ?–5.7)
HCT VFR BLD AUTO: 43.7 % (ref 35–48)
HDLC SERPL-MCNC: 57 MG/DL (ref 40–59)
HGB BLD-MCNC: 13.7 G/DL (ref 12–16)
IMM GRANULOCYTES # BLD AUTO: 0.01 X10(3) UL (ref 0–1)
IMM GRANULOCYTES NFR BLD: 0.2 %
KETONES UR STRIP.AUTO-MCNC: NEGATIVE MG/DL
LDLC SERPL CALC-MCNC: 66 MG/DL (ref ?–100)
LEUKOCYTE ESTERASE UR QL STRIP.AUTO: NEGATIVE
LYMPHOCYTES # BLD AUTO: 2.73 X10(3) UL (ref 1–4)
LYMPHOCYTES NFR BLD AUTO: 48.3 %
M PROTEIN MFR SERPL ELPH: 7.9 G/DL (ref 6.4–8.2)
MCH RBC QN AUTO: 30.6 PG (ref 26–34)
MCHC RBC AUTO-ENTMCNC: 31.4 G/DL (ref 31–37)
MCV RBC AUTO: 97.5 FL (ref 80–100)
MICROALBUMIN UR-MCNC: 3.52 MG/DL
MICROALBUMIN/CREAT 24H UR-RTO: 13.6 UG/MG (ref ?–30)
MONOCYTES # BLD AUTO: 0.65 X10(3) UL (ref 0.1–1)
MONOCYTES NFR BLD AUTO: 11.5 %
NEUTROPHILS # BLD AUTO: 1.92 X10 (3) UL (ref 1.5–7.7)
NEUTROPHILS # BLD AUTO: 1.92 X10(3) UL (ref 1.5–7.7)
NEUTROPHILS NFR BLD AUTO: 34 %
NITRITE UR QL STRIP.AUTO: NEGATIVE
NONHDLC SERPL-MCNC: 79 MG/DL (ref ?–130)
OSMOLALITY SERPL CALC.SUM OF ELEC: 288 MOSM/KG (ref 275–295)
PATIENT FASTING Y/N/NP: YES
PATIENT FASTING Y/N/NP: YES
PH UR STRIP.AUTO: 6 [PH] (ref 4.5–8)
PLATELET # BLD AUTO: 254 10(3)UL (ref 150–450)
POTASSIUM SERPL-SCNC: 4 MMOL/L (ref 3.5–5.1)
PROT UR STRIP.AUTO-MCNC: NEGATIVE MG/DL
RBC # BLD AUTO: 4.48 X10(6)UL (ref 3.8–5.3)
RBC UR QL AUTO: NEGATIVE
SODIUM SERPL-SCNC: 139 MMOL/L (ref 136–145)
SP GR UR STRIP.AUTO: 1.02 (ref 1–1.03)
T4 SERPL-MCNC: 9.3 UG/DL (ref 4.8–13.9)
TRIGL SERPL-MCNC: 66 MG/DL (ref 30–149)
TSI SER-ACNC: 1.89 MIU/ML (ref 0.36–3.74)
UROBILINOGEN UR STRIP.AUTO-MCNC: 4 MG/DL
VIT D+METAB SERPL-MCNC: 34.8 NG/ML (ref 30–100)
VLDLC SERPL CALC-MCNC: 13 MG/DL (ref 0–30)
WBC # BLD AUTO: 5.7 X10(3) UL (ref 4–11)

## 2020-07-18 PROCEDURE — 84436 ASSAY OF TOTAL THYROXINE: CPT | Performed by: INTERNAL MEDICINE

## 2020-07-18 PROCEDURE — 99213 OFFICE O/P EST LOW 20 MIN: CPT | Performed by: INTERNAL MEDICINE

## 2020-07-18 PROCEDURE — 81003 URINALYSIS AUTO W/O SCOPE: CPT | Performed by: INTERNAL MEDICINE

## 2020-07-18 PROCEDURE — 80050 GENERAL HEALTH PANEL: CPT | Performed by: INTERNAL MEDICINE

## 2020-07-18 PROCEDURE — 83036 HEMOGLOBIN GLYCOSYLATED A1C: CPT | Performed by: INTERNAL MEDICINE

## 2020-07-18 PROCEDURE — 3078F DIAST BP <80 MM HG: CPT | Performed by: INTERNAL MEDICINE

## 2020-07-18 PROCEDURE — 3074F SYST BP LT 130 MM HG: CPT | Performed by: INTERNAL MEDICINE

## 2020-07-18 PROCEDURE — 82043 UR ALBUMIN QUANTITATIVE: CPT | Performed by: INTERNAL MEDICINE

## 2020-07-18 PROCEDURE — 80061 LIPID PANEL: CPT | Performed by: INTERNAL MEDICINE

## 2020-07-18 PROCEDURE — 82306 VITAMIN D 25 HYDROXY: CPT | Performed by: INTERNAL MEDICINE

## 2020-07-18 PROCEDURE — 3008F BODY MASS INDEX DOCD: CPT | Performed by: INTERNAL MEDICINE

## 2020-07-18 PROCEDURE — 82570 ASSAY OF URINE CREATININE: CPT | Performed by: INTERNAL MEDICINE

## 2020-07-18 RX ORDER — DICLOFENAC POTASSIUM 50 MG/1
50 TABLET, FILM COATED ORAL 3 TIMES DAILY
COMMUNITY
End: 2020-07-18

## 2020-07-18 RX ORDER — METHYLPREDNISOLONE 4 MG/1
TABLET ORAL
Qty: 1 KIT | Refills: 0 | Status: SHIPPED | OUTPATIENT
Start: 2020-07-18 | End: 2020-11-04 | Stop reason: ALTCHOICE

## 2020-07-18 RX ORDER — DICLOFENAC POTASSIUM 50 MG/1
50 TABLET, FILM COATED ORAL 2 TIMES DAILY
Qty: 60 TABLET | Refills: 0 | Status: SHIPPED | OUTPATIENT
Start: 2020-07-18 | End: 2020-08-17

## 2020-07-18 NOTE — PROGRESS NOTES
Alyssa Venegas  1960 is a 63year old female. Patient presents with:  Back Pain      HPI:   C/o of low back pain-chronic.   Now has recent exacerbation not been able to go to work for the last few days  Current Outpatient Medications   Medication bowel habits no. Constipation no. Diarrhea no. Distention no. Dysphagia no. Loss of appetite none. Vomiting no. Weight loss no. Genitourinary:   Loss of control of urine no. Recurrent Urinary Tract Infection (UTI) no .  Abnormal menstrual bleeding No. Blo directed. -     OP REFERRAL TO [facility]    Laboratory examination ordered as part of a routine general medical examination  -     ASSAY, THYROID STIM HORMONE; Future  -     T4(THYROXINE TOTAL);  Future  -     HEMOGLOBIN A1C; Future

## 2020-07-20 ENCOUNTER — TELEPHONE (OUTPATIENT)
Dept: INTERNAL MEDICINE CLINIC | Facility: CLINIC | Age: 60
End: 2020-07-20

## 2020-07-20 NOTE — TELEPHONE ENCOUNTER
FMLA forms received from [facility] - forms placed in provider in box for completion.

## 2020-07-22 ENCOUNTER — TELEPHONE (OUTPATIENT)
Dept: INTERNAL MEDICINE CLINIC | Facility: CLINIC | Age: 60
End: 2020-07-22

## 2020-07-22 NOTE — TELEPHONE ENCOUNTER
Pt requesting a return to work note stating she is ok to return to work on Monday July 27th. Please advise if ok to issue note.  TY

## 2020-07-22 NOTE — TELEPHONE ENCOUNTER
Pt notified of provider message and verbalized understanding. OV scheduled.   Future Appointments   Date Time Provider Alexis To   7/28/2020  9:45 AM Ced Deleon MD [facility] [place]

## 2020-07-23 NOTE — TELEPHONE ENCOUNTER
Letter signed by provider. Notified pt letter ready for . Pt verbalized understanding. Placed at  file folder under \"S\".

## 2020-07-27 ENCOUNTER — OFFICE VISIT (OUTPATIENT)
Dept: INTERNAL MEDICINE CLINIC | Facility: CLINIC | Age: 60
End: 2020-07-27
Payer: COMMERCIAL

## 2020-07-27 DIAGNOSIS — G89.29 CHRONIC BILATERAL LOW BACK PAIN WITH RIGHT-SIDED SCIATICA: Primary | ICD-10-CM

## 2020-07-27 DIAGNOSIS — M54.41 CHRONIC BILATERAL LOW BACK PAIN WITH RIGHT-SIDED SCIATICA: Primary | ICD-10-CM

## 2020-07-27 PROCEDURE — 99213 OFFICE O/P EST LOW 20 MIN: CPT | Performed by: INTERNAL MEDICINE

## 2020-07-27 NOTE — PROGRESS NOTES
Bhakti GARCIA 1960 is a 63year old female.     Patient presents with:  Forms Completion      HPI:   Here for short-term disability form to be filled [place]  Current Outpatient Medications   Medication Sig Dispense Refill   • Diclofenac Potassium 50

## 2020-07-28 NOTE — TELEPHONE ENCOUNTER
[facility] along with notes from [address] on 7/18/20 @ [phone] - Fax confirmation received    Copy sent to pts home address as requested. Copies made for scan and pod FMLA folder.

## 2020-08-06 ENCOUNTER — TELEPHONE (OUTPATIENT)
Dept: INTERNAL MEDICINE CLINIC | Facility: CLINIC | Age: 60
End: 2020-08-06

## 2020-08-06 NOTE — TELEPHONE ENCOUNTER
Pt stated her disability paperwork has the wrong date for the date disability should start. Pt stated it says 7/18/2020but should say 7/2/2020 (same date as pt reported stop working 7/2/2020). Advised would discuss w/ provider tomorrow and advise.  Kj mercado

## 2020-08-07 NOTE — TELEPHONE ENCOUNTER
Form updated by provider per pt's request. Updated form faxed to the [facility] and fax confirmation received. Copy placed in outgoing mail per pt's request. Pt aware.

## 2020-08-11 DIAGNOSIS — E78.00 PURE HYPERCHOLESTEROLEMIA: Primary | Chronic | ICD-10-CM

## 2020-08-11 RX ORDER — ATORVASTATIN CALCIUM 20 MG/1
TABLET, FILM COATED ORAL
Qty: 90 TABLET | Refills: 1 | Status: SHIPPED | OUTPATIENT
Start: 2020-08-11 | End: 2021-03-01

## 2020-08-11 NOTE — TELEPHONE ENCOUNTER
Passed protocol for atorvastatin    Requesting Diclofenac Potassium 50 MG Oral Tab  LOV: 7/27/20  RTC: 3 months  Last Relevant Labs: 7/18/20  Filled: 7/18/20 #60 with 0 refills    Requesting ATORVASTATIN 20 MG Oral Tab  Filled: 1/31/20 #90 with 1 refills

## 2020-08-18 RX ORDER — LOSARTAN POTASSIUM 50 MG/1
100 TABLET ORAL DAILY
Qty: 180 TABLET | Refills: 0 | Status: SHIPPED | OUTPATIENT
Start: 2020-08-18 | End: 2021-01-18

## 2020-08-18 NOTE — TELEPHONE ENCOUNTER
Passed protocol    Requesting LOSARTAN POTASSIUM 50 MG Oral Tab  LOV: 7/27/20  RTC: 3 months  Last Relevant Labs: 7/18/20  Filled: 5/8/20 #180 with 0 refills    Future Appointments   Date Time Provider Alexis To   8/20/2020  2:00 PM Susanna Rene,

## 2020-10-17 NOTE — TELEPHONE ENCOUNTER
Requesting DICLOFENAC SODIUM 50 MG Oral Tab EC  LOV: 7/27/20  RTC: 3 months  Last Relevant Labs: 7/18/20  Filled: 8/11/20 #60 with 0 refills    Future Appointments   Date Time Provider Alexis Zuleika   10/24/2020 12:00 PM Ricardo Lamb MD EMG 8 EM

## 2020-10-28 ENCOUNTER — IMMUNIZATION (OUTPATIENT)
Dept: INTERNAL MEDICINE CLINIC | Facility: CLINIC | Age: 60
End: 2020-10-28
Payer: COMMERCIAL

## 2020-10-28 DIAGNOSIS — Z23 NEED FOR VACCINATION: ICD-10-CM

## 2020-10-28 PROCEDURE — 90471 IMMUNIZATION ADMIN: CPT | Performed by: INTERNAL MEDICINE

## 2020-10-28 PROCEDURE — 90686 IIV4 VACC NO PRSV 0.5 ML IM: CPT | Performed by: INTERNAL MEDICINE

## 2020-11-04 ENCOUNTER — TELEMEDICINE (OUTPATIENT)
Dept: INTERNAL MEDICINE CLINIC | Facility: CLINIC | Age: 60
End: 2020-11-04
Payer: COMMERCIAL

## 2020-11-04 DIAGNOSIS — R05.9 COUGH: ICD-10-CM

## 2020-11-04 DIAGNOSIS — R09.81 CONGESTION OF NASAL SINUS: Primary | ICD-10-CM

## 2020-11-04 PROCEDURE — 99213 OFFICE O/P EST LOW 20 MIN: CPT | Performed by: NURSE PRACTITIONER

## 2020-11-04 RX ORDER — BENZONATATE 100 MG/1
100 CAPSULE ORAL 3 TIMES DAILY PRN
Qty: 30 CAPSULE | Refills: 0 | Status: SHIPPED | OUTPATIENT
Start: 2020-11-04 | End: 2020-11-14

## 2020-11-04 NOTE — PROGRESS NOTES
Virtual Telephone Check-In    Mahesh De Santiago verbally consents to a Virtual/Telephone Check-In visit on 11/04/20. Patient verified identification with name and date of birth.      Patient understands and accepts financial responsibility for any deductible, status: Never Smoker      Smokeless tobacco: Never Used    Alcohol use:  Yes      Alcohol/week: 0.0 standard drinks      Comment: Socially     Drug use: No       REVIEW OF SYSTEMS:   GENERAL: Denies fever, chills,weight change, decreased appetite  EYES: Antonio Jerry

## 2020-11-16 ENCOUNTER — TELEPHONE (OUTPATIENT)
Dept: INTERNAL MEDICINE CLINIC | Facility: CLINIC | Age: 60
End: 2020-11-16

## 2020-11-16 RX ORDER — CODEINE PHOSPHATE AND GUAIFENESIN 10; 100 MG/5ML; MG/5ML
5 SOLUTION ORAL EVERY 6 HOURS PRN
Qty: 240 ML | Refills: 0 | Status: SHIPPED | OUTPATIENT
Start: 2020-11-16 | End: 2020-11-26

## 2020-11-16 NOTE — TELEPHONE ENCOUNTER
Pt had virtual visit with KR on 11/4/20. Benzonatate has not helped much with cough. C/o body aches, chills, diarrhea, HA, loss of appetite, sinus congestion, cough that causes SOB.      Denies baseline SOB or SOB with activities, fever, ST, loss of tas

## 2020-11-16 NOTE — TELEPHONE ENCOUNTER
Patient provided with MD instructions listed below. Pt also advised to self quarantine in home and have someone  OTC medication along with RX and to leave medications outside of door to avoid contact. Pt verbalized understanding.

## 2020-11-16 NOTE — TELEPHONE ENCOUNTER
Please let patient know to take Kaopectate over-the-counter 1 ounce with each liquid bowel movement. She can also get some cough medicine which I will send over [facility] for relief of her cough. Patient take some symptomatic Tylenol.   It is importa

## 2020-11-16 NOTE — TELEPHONE ENCOUNTER
Pt with SOB (barely able to speak) body aches, weakness, fatigue, diarrhea, unable to keep food down. Had televisit with KR 11/4/20.   Covid test scheduled for 11/17

## 2020-11-17 ENCOUNTER — TELEMEDICINE (OUTPATIENT)
Dept: INTERNAL MEDICINE CLINIC | Facility: CLINIC | Age: 60
End: 2020-11-17
Payer: COMMERCIAL

## 2020-11-17 DIAGNOSIS — R53.83 OTHER FATIGUE: Primary | ICD-10-CM

## 2020-11-17 PROCEDURE — 99213 OFFICE O/P EST LOW 20 MIN: CPT | Performed by: INTERNAL MEDICINE

## 2020-11-17 NOTE — PROGRESS NOTES
Virtual Telephone Check-In    Jose D Pritchard verbally consents to a Virtual/Telephone Check-In visit on 11/17/20. Patient has been referred to the [facility] website at [email or web address] to review the yearly Consent to Treat document.     Patient Hazel Das

## 2020-11-18 ENCOUNTER — LAB ENCOUNTER (OUTPATIENT)
Dept: LAB | Age: 60
End: 2020-11-18
Attending: NURSE PRACTITIONER
Payer: COMMERCIAL

## 2020-11-18 DIAGNOSIS — Z20.822 EXPOSURE TO COVID-19 VIRUS: ICD-10-CM

## 2020-11-23 ENCOUNTER — VIRTUAL PHONE E/M (OUTPATIENT)
Dept: INTERNAL MEDICINE CLINIC | Facility: CLINIC | Age: 60
End: 2020-11-23
Payer: COMMERCIAL

## 2020-11-23 ENCOUNTER — TELEPHONE (OUTPATIENT)
Dept: INTERNAL MEDICINE CLINIC | Facility: CLINIC | Age: 60
End: 2020-11-23

## 2020-11-23 DIAGNOSIS — U07.1 COVID-19 VIRUS INFECTION: Primary | ICD-10-CM

## 2020-11-23 DIAGNOSIS — R53.83 OTHER FATIGUE: ICD-10-CM

## 2020-11-23 PROCEDURE — 99213 OFFICE O/P EST LOW 20 MIN: CPT | Performed by: INTERNAL MEDICINE

## 2020-11-23 NOTE — PROGRESS NOTES
Virtual Telephone Check-In    Mahesh De Santiago verbally consents to a Virtual/Telephone Check-In visit on 11/23/20. Patient has been referred to the [facility] website at [email or web address] to review the yearly Consent to Treat document.     Patient Jackie Mcpherson

## 2020-11-23 NOTE — TELEPHONE ENCOUNTER
Spoke with pt and explained to her that we are unable to fax test results to employer - Pt will be sending friend Mauro Jones to  forms - pt made aware that designated person will need photo ID - she expressed understanding.

## 2020-11-23 NOTE — TELEPHONE ENCOUNTER
Patient calling b/c she had virtual visit with Dr Melissa Packer; she is very overwhelmed with COVID 19 diagnosis and doesn't know \"what to do or how to treat or take? \"  She states she should have had more conversation with doctor but she is just upset.  Please

## 2020-11-23 NOTE — TELEPHONE ENCOUNTER
Pt inquire home care instructions for covid19 care. Informed CDC's protocol and advised to purchase a pulse-ox to monitor o2 levels. Pt verbalized understanding.

## 2020-11-23 NOTE — TELEPHONE ENCOUNTER
Lab result need to be reviewed by provider. KR not in the office today. Ok to release covid19 results?   Pt + for covid19

## 2020-11-23 NOTE — TELEPHONE ENCOUNTER
Patient calling to request note/positive COVID 19 results to be faxed to her employer \"ED at Fax [phone]   If this is not possible please callback to discuss further

## 2020-11-30 DIAGNOSIS — I10 ESSENTIAL HYPERTENSION, BENIGN: Primary | Chronic | ICD-10-CM

## 2020-11-30 NOTE — TELEPHONE ENCOUNTER
Metoprolol Succinate  MG Oral Tablet 24 Hr  amLODIPine Besylate 10 MG Oral Tab    [id number] [place] [facility], [facility] [address] AT [address], [phone], [address] [facility] [address]/ [address] [place] [phone]

## 2020-12-01 ENCOUNTER — TELEPHONE (OUTPATIENT)
Dept: INTERNAL MEDICINE CLINIC | Facility: CLINIC | Age: 60
End: 2020-12-01

## 2020-12-01 RX ORDER — AMLODIPINE BESYLATE 10 MG/1
TABLET ORAL
Qty: 90 TABLET | Refills: 1 | Status: SHIPPED | OUTPATIENT
Start: 2020-12-01 | End: 2021-05-29

## 2020-12-01 RX ORDER — METOPROLOL SUCCINATE 100 MG/1
TABLET, EXTENDED RELEASE ORAL
Qty: 90 TABLET | Refills: 1 | Status: SHIPPED | OUTPATIENT
Start: 2020-12-01 | End: 2021-03-11

## 2020-12-01 NOTE — TELEPHONE ENCOUNTER
Per epic pt had a + covid19 test on 11/18/2020. Pt stated work requires an updated letter, as 10 days have past since last letter.     -Pt stated cough, intermittent throughout the day.  Cough spells present after drinking fluids.  -Headache  -Some epistax

## 2020-12-01 NOTE — TELEPHONE ENCOUNTER
Patient would like callback to discuss another note for her work to let them know that she is still sick. She would like it faxed to \"ED at [facility] 25 [phone]       She also is asking if we received disability paperwork from [employer] as well.   Please callb

## 2020-12-01 NOTE — TELEPHONE ENCOUNTER
Passed protocol    Requesting amLODIPine Besylate 10 MG Oral Tab  LOV: 7/27/20  RTC: 3 months  Last Relevant Labs: 7/18/20  Filled: 5/8/20 #90 with 1 refills      Requesting Metoprolol Succinate  MG Oral Tablet 24 Hr  Filled: 5/8/20 #90 with 1 refill

## 2020-12-03 ENCOUNTER — VIRTUAL PHONE E/M (OUTPATIENT)
Dept: INTERNAL MEDICINE CLINIC | Facility: CLINIC | Age: 60
End: 2020-12-03
Payer: COMMERCIAL

## 2020-12-03 DIAGNOSIS — R05.9 COUGH: ICD-10-CM

## 2020-12-03 DIAGNOSIS — U07.1 COVID-19 VIRUS INFECTION: Primary | ICD-10-CM

## 2020-12-03 PROCEDURE — 99213 OFFICE O/P EST LOW 20 MIN: CPT | Performed by: INTERNAL MEDICINE

## 2020-12-03 RX ORDER — METHYLPREDNISOLONE 4 MG/1
TABLET ORAL
Qty: 1 KIT | Refills: 0 | Status: SHIPPED | OUTPATIENT
Start: 2020-12-03 | End: 2020-12-18 | Stop reason: ALTCHOICE

## 2020-12-03 RX ORDER — CODEINE PHOSPHATE AND GUAIFENESIN 10; 100 MG/5ML; MG/5ML
5 SOLUTION ORAL EVERY 6 HOURS PRN
Qty: 240 ML | Refills: 0 | Status: SHIPPED | OUTPATIENT
Start: 2020-12-03 | End: 2020-12-13

## 2020-12-03 RX ORDER — ALBUTEROL SULFATE 90 UG/1
2 AEROSOL, METERED RESPIRATORY (INHALATION) EVERY 6 HOURS PRN
Qty: 2 INHALER | Refills: 0 | Status: SHIPPED | OUTPATIENT
Start: 2020-12-03 | End: 2021-01-02

## 2020-12-03 NOTE — TELEPHONE ENCOUNTER
Patient calling for follow up on return phone call can she have medical assistant check and callback and advise after speaking to Dr Tyler Thomas? Please call today either way.

## 2020-12-03 NOTE — PROGRESS NOTES
Virtual Telephone Check-In    Mahesh De Santiago verbally consents to a Virtual/Telephone Check-In visit on 12/03/20. Patient has been referred to the [facility] website at [email or web address] to review the yearly Consent to Treat document.     Patient Jackie Mcpherson

## 2020-12-03 NOTE — TELEPHONE ENCOUNTER
Future Appointments   Date Time Provider Alexis Zuleika   12/3/2020  4:15 PM Sha Lara MD EMG 8 EMG [place]

## 2020-12-04 ENCOUNTER — TELEPHONE (OUTPATIENT)
Dept: INTERNAL MEDICINE CLINIC | Facility: CLINIC | Age: 60
End: 2020-12-04

## 2020-12-04 NOTE — TELEPHONE ENCOUNTER
Pt had apt 12. 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

## 2020-12-07 ENCOUNTER — TELEPHONE (OUTPATIENT)
Dept: INTERNAL MEDICINE CLINIC | Facility: CLINIC | Age: 60
End: 2020-12-07

## 2020-12-07 NOTE — TELEPHONE ENCOUNTER
Pt is asking if papers were completed and  faxed over to [facility] ? She stated she talked with the MA  on Friday and they did have the papers.

## 2020-12-08 ENCOUNTER — HOSPITAL ENCOUNTER (OUTPATIENT)
Dept: GENERAL RADIOLOGY | Age: 60
Discharge: HOME | End: 2020-12-08
Attending: INTERNAL MEDICINE
Payer: COMMERCIAL

## 2020-12-08 ENCOUNTER — TELEPHONE (OUTPATIENT)
Dept: INTERNAL MEDICINE CLINIC | Facility: CLINIC | Age: 60
End: 2020-12-08

## 2020-12-08 DIAGNOSIS — R05.9 COUGH: ICD-10-CM

## 2020-12-08 DIAGNOSIS — U07.1 COVID-19 VIRUS INFECTION: ICD-10-CM

## 2020-12-08 DIAGNOSIS — U07.1 COVID-19 VIRUS INFECTION: Primary | ICD-10-CM

## 2020-12-08 PROCEDURE — 71046 X-RAY EXAM CHEST 2 VIEWS: CPT | Performed by: INTERNAL MEDICINE

## 2020-12-08 NOTE — TELEPHONE ENCOUNTER
----- Message from Chato Marrero MD sent at 12/8/2020  4:06 PM CST -----  Reviewed results   CONCLUSION:  The lungs are mildly hyperinflated. Jaylinistine Kalyan is a mild amount of peripheral mixed interstitial alveolar infiltrates in both upper lobes and lower lobes

## 2020-12-09 ENCOUNTER — TELEPHONE (OUTPATIENT)
Dept: INTERNAL MEDICINE CLINIC | Facility: CLINIC | Age: 60
End: 2020-12-09

## 2020-12-09 NOTE — TELEPHONE ENCOUNTER
Pt is requesting a Doctor's note stating that pt's illness is related to covid-19 and that is undetermined right now when would pt be back to work, pt mentioned to ask the  To write note with his owns words but is very important that the note has the wo

## 2020-12-15 ENCOUNTER — HOSPITAL ENCOUNTER (OUTPATIENT)
Dept: GENERAL RADIOLOGY | Age: 60
Discharge: HOME | End: 2020-12-15
Attending: INTERNAL MEDICINE
Payer: COMMERCIAL

## 2020-12-15 DIAGNOSIS — U07.1 COVID-19 VIRUS INFECTION: ICD-10-CM

## 2020-12-15 DIAGNOSIS — R05.9 COUGH: ICD-10-CM

## 2020-12-15 DIAGNOSIS — E11.9 TYPE 2 DIABETES MELLITUS WITHOUT COMPLICATION, WITHOUT LONG-TERM CURRENT USE OF INSULIN (HCC): Chronic | ICD-10-CM

## 2020-12-15 PROCEDURE — 71046 X-RAY EXAM CHEST 2 VIEWS: CPT | Performed by: INTERNAL MEDICINE

## 2020-12-15 NOTE — TELEPHONE ENCOUNTER
Pharmacy requesting refill for:  METFORMIN  MG Oral Tab    [facility] #[id number] [facility], [address] F[place] JIMENES, [phone], [phone]

## 2020-12-16 NOTE — TELEPHONE ENCOUNTER
Medication(s) to Refill:   Requested Prescriptions     Pending Prescriptions Disp Refills   • metFORMIN HCl 500 MG Oral Tab 180 tablet 1     Sig: Take 1 tablet (500 mg total) by mouth 2 (two) times daily.        LOV: 12/3/2020   RTC: none noted   Recent lab

## 2020-12-17 ENCOUNTER — TELEPHONE (OUTPATIENT)
Dept: INTERNAL MEDICINE CLINIC | Facility: CLINIC | Age: 60
End: 2020-12-17

## 2020-12-17 NOTE — TELEPHONE ENCOUNTER
Pt called regarding cxr results. Pt states that she wanted to update  regarding cough. Yesterday she had a coughing spell and after noticed blood. Pt states this happened all day yesterday which went from bright red blood to pink tinged. Today she has not noticed any blood. I advised pt to increase fluids, suck on lozenges to keep throat moist and message will be sent to . Future Appointments   Date Time Provider Alexis To   12/18/2020  9:30 AM Oksana Geller MD EMG 8 EMG [facility]        Pt is also in need of updated work note. Letter pending.

## 2020-12-17 NOTE — TELEPHONE ENCOUNTER
Attending physicians statement received via fax from the [facility] - placed in providers inbox for completion

## 2020-12-18 ENCOUNTER — OFFICE VISIT (OUTPATIENT)
Dept: INTERNAL MEDICINE CLINIC | Facility: CLINIC | Age: 60
End: 2020-12-18
Payer: COMMERCIAL

## 2020-12-18 DIAGNOSIS — U07.1 COVID-19 VIRUS INFECTION: Primary | ICD-10-CM

## 2020-12-18 DIAGNOSIS — E11.9 TYPE 2 DIABETES MELLITUS WITHOUT COMPLICATION, WITHOUT LONG-TERM CURRENT USE OF INSULIN (HCC): ICD-10-CM

## 2020-12-18 PROCEDURE — 99213 OFFICE O/P EST LOW 20 MIN: CPT | Performed by: INTERNAL MEDICINE

## 2020-12-18 RX ORDER — PREDNISONE 1 MG/1
TABLET ORAL
Qty: 36 TABLET | Refills: 0 | Status: SHIPPED | OUTPATIENT
Start: 2020-12-18 | End: 2021-01-11 | Stop reason: ALTCHOICE

## 2020-12-18 RX ORDER — LEVOFLOXACIN 500 MG/1
500 TABLET, FILM COATED ORAL DAILY
Qty: 10 TABLET | Refills: 0 | Status: SHIPPED | OUTPATIENT
Start: 2020-12-18 | End: 2020-12-28

## 2020-12-18 NOTE — PROGRESS NOTES
Virtual Telephone Check-In    Rosyarelis Jernigan verbally consents to a Virtual/Telephone Check-In visit on 12/18/20. Patient has been referred to the [facility] website at [email or web address]/consents to review the yearly Consent to Treat document.     Patient Wing Chao

## 2020-12-22 NOTE — TELEPHONE ENCOUNTER
Pt asking for status of paperwork and when can expect to be sent to [facility].   Call to advise

## 2020-12-23 NOTE — TELEPHONE ENCOUNTER
Provider still working on completing paperwork - pt contacted and notified and she expressed understanding. Informed pt that we will be in touch when we fax paperwork.

## 2020-12-24 ENCOUNTER — LAB ENCOUNTER (OUTPATIENT)
Dept: LAB | Age: 60
End: 2020-12-24
Attending: INTERNAL MEDICINE
Payer: COMMERCIAL

## 2020-12-24 ENCOUNTER — HOSPITAL ENCOUNTER (OUTPATIENT)
Dept: GENERAL RADIOLOGY | Age: 60
Discharge: HOME | End: 2020-12-24
Attending: INTERNAL MEDICINE
Payer: COMMERCIAL

## 2020-12-24 ENCOUNTER — TELEPHONE (OUTPATIENT)
Dept: INTERNAL MEDICINE CLINIC | Facility: CLINIC | Age: 60
End: 2020-12-24

## 2020-12-24 DIAGNOSIS — U07.1 COVID-19 VIRUS INFECTION: ICD-10-CM

## 2020-12-24 PROCEDURE — 71046 X-RAY EXAM CHEST 2 VIEWS: CPT | Performed by: INTERNAL MEDICINE

## 2020-12-24 PROCEDURE — 36415 COLL VENOUS BLD VENIPUNCTURE: CPT

## 2020-12-24 PROCEDURE — 80053 COMPREHEN METABOLIC PANEL: CPT

## 2020-12-24 PROCEDURE — 85025 COMPLETE CBC W/AUTO DIFF WBC: CPT

## 2020-12-24 NOTE — TELEPHONE ENCOUNTER
Forms have been completed and faxed to the [facility] at [phone] with supporting OV notes - pt informed and expressed understanding and requested a copy to be sent to her home address - address was verified.     Fax confirmation received - copies made fo

## 2020-12-28 ENCOUNTER — TELEPHONE (OUTPATIENT)
Dept: INTERNAL MEDICINE CLINIC | Facility: CLINIC | Age: 60
End: 2020-12-28

## 2020-12-28 NOTE — TELEPHONE ENCOUNTER
Yoav with [employer] notified that dosing instructions is correct on RX for prednisone which was sent. Yoav verbalized understanding.

## 2020-12-28 NOTE — TELEPHONE ENCOUNTER
Jose Bose ( pharmacist) called requesting clarification for Sig:   Prednisone 5 mg tabs     6 tabs  for 2 days   5 tabs for 2 days   4 tabs  for 2 days   2 tabs  for 2 days   1 tab  for 2 days    Jose Bose stated Dr Leonor De Jesus did not verify #3 for 2 days.    Call back

## 2020-12-29 ENCOUNTER — VIRTUAL PHONE E/M (OUTPATIENT)
Dept: INTERNAL MEDICINE CLINIC | Facility: CLINIC | Age: 60
End: 2020-12-29
Payer: COMMERCIAL

## 2020-12-29 DIAGNOSIS — R91.8 PULMONARY INFILTRATES ON CXR: ICD-10-CM

## 2020-12-29 DIAGNOSIS — U07.1 COVID-19 VIRUS INFECTION: Primary | ICD-10-CM

## 2020-12-29 PROCEDURE — 99213 OFFICE O/P EST LOW 20 MIN: CPT | Performed by: INTERNAL MEDICINE

## 2020-12-29 NOTE — PROGRESS NOTES
Virtual Telephone Check-In    Gina Clemons verbally consents to a Virtual/Telephone Check-In visit on 12/29/20. Patient has been referred to the [facility] website at [email or web address]/consents to review the yearly Consent to Treat document.     Patient Colette Colon

## 2021-01-02 ENCOUNTER — HOSPITAL ENCOUNTER (OUTPATIENT)
Dept: CT IMAGING | Age: 61
Discharge: HOME | End: 2021-01-02
Attending: INTERNAL MEDICINE
Payer: COMMERCIAL

## 2021-01-02 DIAGNOSIS — U07.1 COVID-19 VIRUS INFECTION: ICD-10-CM

## 2021-01-02 DIAGNOSIS — R91.8 PULMONARY INFILTRATES ON CXR: ICD-10-CM

## 2021-01-02 PROCEDURE — 71250 CT THORAX DX C-: CPT | Performed by: INTERNAL MEDICINE

## 2021-01-06 ENCOUNTER — TELEPHONE (OUTPATIENT)
Dept: INTERNAL MEDICINE CLINIC | Facility: CLINIC | Age: 61
End: 2021-01-06

## 2021-01-06 NOTE — TELEPHONE ENCOUNTER
Pt states she is in need of updated letter for employer faxed to ED  at fax # [phone].  Needs letter to reflect today's date

## 2021-01-06 NOTE — TELEPHONE ENCOUNTER
Pt would like a call back from nurse would like to know if office received a fax from [employer] for her disability/FMLA ppw that needs to be filled out

## 2021-01-07 NOTE — TELEPHONE ENCOUNTER
Pt calling to check status of paperwork - she stated that she had it faxed yesterday (1/6/21)  Pt was informed that pod fax machine was broken and was provided the fax number to triage. Pt was informed by Nayeli Bojorquez that the fax was confirmed as sent.   Vianca Rothman

## 2021-01-15 ENCOUNTER — TELEPHONE (OUTPATIENT)
Dept: INTERNAL MEDICINE CLINIC | Facility: CLINIC | Age: 61
End: 2021-01-15

## 2021-01-15 NOTE — TELEPHONE ENCOUNTER
Claim # [id number]    Brisa Sam calling from [employer] needs a date for patient to return to work. Informed of previous message pending further evaluation.     States she needs an exact date

## 2021-01-18 RX ORDER — LOSARTAN POTASSIUM 50 MG/1
100 TABLET ORAL DAILY
Qty: 180 TABLET | Refills: 0 | Status: SHIPPED | OUTPATIENT
Start: 2021-01-18 | End: 2021-05-29

## 2021-01-18 NOTE — TELEPHONE ENCOUNTER
Please let Yessenia Sharpe know the work done should be in 4 weeks however the patient is going to be evaluated next week in the office

## 2021-01-18 NOTE — TELEPHONE ENCOUNTER
LM for Barby Sharad with [facility].      Future Appointments   Date Time Provider Alexis To   1/26/2021 12:30 PM Harley Dorantes MD EMG 8 EM[facility] [place]

## 2021-01-18 NOTE — TELEPHONE ENCOUNTER
Is pt going to be off for another 4 weeks? Also pt states that she is to have an in office appointment. Is this correct?

## 2021-01-18 NOTE — TELEPHONE ENCOUNTER
Passed protocol    Requesting losartan Potassium 50 MG Oral Tab  LOV: 12/18/20  RTC: NA  Last Relevant Labs: 12/24/20  Filled: 8/18/20 #180 with 0 refills    Future Appointments   Date Time Provider Alexis To   1/26/2021 12:30 PM Tanna Jeans, MD

## 2021-02-01 ENCOUNTER — OFFICE VISIT (OUTPATIENT)
Dept: INTERNAL MEDICINE CLINIC | Facility: CLINIC | Age: 61
End: 2021-02-01
Payer: COMMERCIAL

## 2021-02-01 VITALS
HEIGHT: 65.5 IN | DIASTOLIC BLOOD PRESSURE: 92 MMHG | SYSTOLIC BLOOD PRESSURE: 140 MMHG | BODY MASS INDEX: 26.7 KG/M2 | TEMPERATURE: 99 F | RESPIRATION RATE: 16 BRPM | HEART RATE: 86 BPM | OXYGEN SATURATION: 96 % | WEIGHT: 162.19 LBS

## 2021-02-01 DIAGNOSIS — Z00.00 LABORATORY EXAMINATION ORDERED AS PART OF A ROUTINE GENERAL MEDICAL EXAMINATION: ICD-10-CM

## 2021-02-01 DIAGNOSIS — Z12.39 ENCOUNTER FOR SCREENING FOR MALIGNANT NEOPLASM OF BREAST, UNSPECIFIED SCREENING MODALITY: ICD-10-CM

## 2021-02-01 DIAGNOSIS — Z86.16 HISTORY OF COVID-19: ICD-10-CM

## 2021-02-01 DIAGNOSIS — R91.8 PULMONARY INFILTRATE PRESENT ON COMPUTED TOMOGRAPHY: Primary | ICD-10-CM

## 2021-02-01 PROCEDURE — 96127 BRIEF EMOTIONAL/BEHAV ASSMT: CPT | Performed by: INTERNAL MEDICINE

## 2021-02-01 PROCEDURE — 99213 OFFICE O/P EST LOW 20 MIN: CPT | Performed by: INTERNAL MEDICINE

## 2021-02-01 PROCEDURE — 3008F BODY MASS INDEX DOCD: CPT | Performed by: INTERNAL MEDICINE

## 2021-02-01 PROCEDURE — 3080F DIAST BP >= 90 MM HG: CPT | Performed by: INTERNAL MEDICINE

## 2021-02-01 PROCEDURE — 3077F SYST BP >= 140 MM HG: CPT | Performed by: INTERNAL MEDICINE

## 2021-02-01 NOTE — PROGRESS NOTES
Kelsie La  1960 is a 62year old female who presents for upper respiratory symptoms    Patient presents with:   Follow - Up        HPI:   Still having spasms of coughing did see the pulmonologist who wants a high-resolution CT chest to be done pain,wheezing  CARDIOVASCULAR: denies chest pain on exertion  GI: no nausea or abdominal pain  NEURO: denies headaches    EXAM:   BP (!) 140/92   Pulse 86   Temp 98.5 °F (36.9 °C) (Oral)   Resp 16   Ht 5' 5.5\" (1.664 m)   Wt 162 lb 3.2 oz (73.6 kg)   LMP

## 2021-02-01 NOTE — TELEPHONE ENCOUNTER
Medical information request received from the [facility] - Attending physicians statement also included. Placed in provider in box for review and completion.

## 2021-02-02 ENCOUNTER — TELEPHONE (OUTPATIENT)
Dept: INTERNAL MEDICINE CLINIC | Facility: CLINIC | Age: 61
End: 2021-02-02

## 2021-02-02 NOTE — TELEPHONE ENCOUNTER
Pt needs paperwork completed for employer. Paperwork given to provider during [address] 2/1. Patient is requesting paperwork for [address] completed asap as she has been contacted multiple times for completed paperwork already.

## 2021-02-02 NOTE — TELEPHONE ENCOUNTER
Spoke with pt and explained to her the policy for paperwork to be completed - pt expressed understanding and wanted to simply call so she can inform her employer that she did call.  Pt was also informed that the soonest the pts paperwork would be completed

## 2021-02-03 ENCOUNTER — TELEPHONE (OUTPATIENT)
Dept: INTERNAL MEDICINE CLINIC | Facility: CLINIC | Age: 61
End: 2021-02-03

## 2021-02-05 ENCOUNTER — TELEMEDICINE (OUTPATIENT)
Dept: INTERNAL MEDICINE CLINIC | Facility: CLINIC | Age: 61
End: 2021-02-05
Payer: COMMERCIAL

## 2021-02-05 DIAGNOSIS — R91.8 PULMONARY INFILTRATE PRESENT ON COMPUTED TOMOGRAPHY: ICD-10-CM

## 2021-02-05 DIAGNOSIS — Z86.16 HISTORY OF COVID-19: Primary | ICD-10-CM

## 2021-02-05 PROCEDURE — 99213 OFFICE O/P EST LOW 20 MIN: CPT | Performed by: INTERNAL MEDICINE

## 2021-02-05 NOTE — TELEPHONE ENCOUNTER
Completed and signed FMLA forms faxed back to [employer] at [phone] with appropriate office notes - fax confirmation received. Copies made for scan and pod FMLA folder.      Disability form also faxed to [address] [place] [address] @ [phone] - fax confir

## 2021-02-05 NOTE — PROGRESS NOTES
Virtual Telephone Check-In    Monique Mackey verbally consents to a Virtual/Telephone Check-In visit on 02/05/21. Patient has been referred to the [facility] website at [email or web address] to review the yearly Consent to Treat document.     Patient [employer]

## 2021-02-27 DIAGNOSIS — E78.00 PURE HYPERCHOLESTEROLEMIA: Chronic | ICD-10-CM

## 2021-03-01 RX ORDER — ATORVASTATIN CALCIUM 20 MG/1
TABLET, FILM COATED ORAL
Qty: 90 TABLET | Refills: 1 | Status: SHIPPED | OUTPATIENT
Start: 2021-03-01 | End: 2021-11-26

## 2021-03-01 NOTE — TELEPHONE ENCOUNTER
Protocol passed   Medication(s) to Refill:   Requested Prescriptions     Pending Prescriptions Disp Refills   • ATORVASTATIN 20 MG Oral Tab [Pharmacy Med Name: ATORVASTATIN 20MG TABLETS] 90 tablet 1     Sig: TAKE 1 TABLET(20 MG) BY MOUTH EVERY NIGHT of the highly sensitive equipment and to ensure the privacy of all our patients, children will not be permitted in the exam rooms, unless otherwise noted and in accordance with departmental policy.   PATIENT RESPONSIBILITY ESTIMATE - (Estimate) We will prov

## 2021-03-09 ENCOUNTER — LAB ENCOUNTER (OUTPATIENT)
Dept: LAB | Age: 61
End: 2021-03-09
Attending: INTERNAL MEDICINE
Payer: COMMERCIAL

## 2021-03-09 ENCOUNTER — HOSPITAL ENCOUNTER (OUTPATIENT)
Dept: MAMMOGRAPHY | Age: 61
Discharge: HOME | End: 2021-03-09
Attending: INTERNAL MEDICINE
Payer: COMMERCIAL

## 2021-03-09 DIAGNOSIS — Z00.00 LABORATORY EXAMINATION ORDERED AS PART OF A ROUTINE GENERAL MEDICAL EXAMINATION: ICD-10-CM

## 2021-03-09 DIAGNOSIS — Z12.39 ENCOUNTER FOR SCREENING FOR MALIGNANT NEOPLASM OF BREAST, UNSPECIFIED SCREENING MODALITY: ICD-10-CM

## 2021-03-09 LAB
ALBUMIN SERPL-MCNC: 3.8 G/DL (ref 3.4–5)
ALBUMIN/GLOB SERPL: 1 {RATIO} (ref 1–2)
ALP LIVER SERPL-CCNC: 90 U/L
ALT SERPL-CCNC: 34 U/L
ANION GAP SERPL CALC-SCNC: 7 MMOL/L (ref 0–18)
AST SERPL-CCNC: 23 U/L (ref 15–37)
BASOPHILS # BLD AUTO: 0.05 X10(3) UL (ref 0–0.2)
BASOPHILS NFR BLD AUTO: 0.9 %
BILIRUB SERPL-MCNC: 0.5 MG/DL (ref 0.1–2)
BILIRUB UR QL STRIP.AUTO: NEGATIVE
BUN BLD-MCNC: 12 MG/DL (ref 7–18)
BUN/CREAT SERPL: 15.6 (ref 10–20)
CALCIUM BLD-MCNC: 9.4 MG/DL (ref 8.5–10.1)
CHLORIDE SERPL-SCNC: 106 MMOL/L (ref 98–112)
CHOLEST SMN-MCNC: 137 MG/DL (ref ?–200)
CO2 SERPL-SCNC: 30 MMOL/L (ref 21–32)
COLOR UR AUTO: YELLOW
CREAT BLD-MCNC: 0.77 MG/DL
DEPRECATED RDW RBC AUTO: 50.7 FL (ref 35.1–46.3)
EOSINOPHIL # BLD AUTO: 0.33 X10(3) UL (ref 0–0.7)
EOSINOPHIL NFR BLD AUTO: 5.7 %
ERYTHROCYTE [DISTWIDTH] IN BLOOD BY AUTOMATED COUNT: 14.2 % (ref 11–15)
EST. AVERAGE GLUCOSE BLD GHB EST-MCNC: 131 MG/DL (ref 68–126)
GLOBULIN PLAS-MCNC: 4 G/DL (ref 2.8–4.4)
GLUCOSE BLD-MCNC: 107 MG/DL (ref 70–99)
GLUCOSE UR STRIP.AUTO-MCNC: NEGATIVE MG/DL
HBA1C MFR BLD HPLC: 6.2 % (ref ?–5.7)
HCT VFR BLD AUTO: 41.9 %
HDLC SERPL-MCNC: 74 MG/DL (ref 40–59)
HGB BLD-MCNC: 13.2 G/DL
IMM GRANULOCYTES # BLD AUTO: 0 X10(3) UL (ref 0–1)
IMM GRANULOCYTES NFR BLD: 0 %
KETONES UR STRIP.AUTO-MCNC: NEGATIVE MG/DL
LDLC SERPL CALC-MCNC: 52 MG/DL (ref ?–100)
LEUKOCYTE ESTERASE UR QL STRIP.AUTO: NEGATIVE
LYMPHOCYTES # BLD AUTO: 2.3 X10(3) UL (ref 1–4)
LYMPHOCYTES NFR BLD AUTO: 39.7 %
M PROTEIN MFR SERPL ELPH: 7.8 G/DL (ref 6.4–8.2)
MCH RBC QN AUTO: 30.8 PG (ref 26–34)
MCHC RBC AUTO-ENTMCNC: 31.5 G/DL (ref 31–37)
MCV RBC AUTO: 97.9 FL
MONOCYTES # BLD AUTO: 0.73 X10(3) UL (ref 0.1–1)
MONOCYTES NFR BLD AUTO: 12.6 %
NEUTROPHILS # BLD AUTO: 2.39 X10 (3) UL (ref 1.5–7.7)
NEUTROPHILS # BLD AUTO: 2.39 X10(3) UL (ref 1.5–7.7)
NEUTROPHILS NFR BLD AUTO: 41.1 %
NITRITE UR QL STRIP.AUTO: NEGATIVE
NONHDLC SERPL-MCNC: 63 MG/DL (ref ?–130)
OSMOLALITY SERPL CALC.SUM OF ELEC: 296 MOSM/KG (ref 275–295)
PH UR STRIP.AUTO: 7 [PH] (ref 5–8)
PLATELET # BLD AUTO: 260 10(3)UL (ref 150–450)
POTASSIUM SERPL-SCNC: 4.1 MMOL/L (ref 3.5–5.1)
PROT UR STRIP.AUTO-MCNC: NEGATIVE MG/DL
RBC # BLD AUTO: 4.28 X10(6)UL
RBC UR QL AUTO: NEGATIVE
SODIUM SERPL-SCNC: 143 MMOL/L (ref 136–145)
SP GR UR STRIP.AUTO: 1.01 (ref 1–1.03)
T4 SERPL-MCNC: 8.1 UG/DL
TRIGL SERPL-MCNC: 53 MG/DL (ref 30–149)
TSI SER-ACNC: 1.52 MIU/ML (ref 0.36–3.74)
UROBILINOGEN UR STRIP.AUTO-MCNC: <2 MG/DL
VIT D+METAB SERPL-MCNC: 36.4 NG/ML (ref 30–100)
VLDLC SERPL CALC-MCNC: 11 MG/DL (ref 0–30)
WBC # BLD AUTO: 5.8 X10(3) UL (ref 4–11)

## 2021-03-09 PROCEDURE — 80053 COMPREHEN METABOLIC PANEL: CPT

## 2021-03-09 PROCEDURE — 77063 BREAST TOMOSYNTHESIS BI: CPT | Performed by: INTERNAL MEDICINE

## 2021-03-09 PROCEDURE — 77067 SCR MAMMO BI INCL CAD: CPT | Performed by: INTERNAL MEDICINE

## 2021-03-09 PROCEDURE — 85025 COMPLETE CBC W/AUTO DIFF WBC: CPT

## 2021-03-09 PROCEDURE — 82306 VITAMIN D 25 HYDROXY: CPT

## 2021-03-09 PROCEDURE — 3044F HG A1C LEVEL LT 7.0%: CPT | Performed by: INTERNAL MEDICINE

## 2021-03-09 PROCEDURE — 36415 COLL VENOUS BLD VENIPUNCTURE: CPT

## 2021-03-09 PROCEDURE — 81003 URINALYSIS AUTO W/O SCOPE: CPT

## 2021-03-09 PROCEDURE — 80061 LIPID PANEL: CPT

## 2021-03-09 PROCEDURE — 84443 ASSAY THYROID STIM HORMONE: CPT

## 2021-03-09 PROCEDURE — 84436 ASSAY OF TOTAL THYROXINE: CPT

## 2021-03-09 PROCEDURE — 83036 HEMOGLOBIN GLYCOSYLATED A1C: CPT

## 2021-03-11 ENCOUNTER — OFFICE VISIT (OUTPATIENT)
Dept: INTERNAL MEDICINE CLINIC | Facility: CLINIC | Age: 61
End: 2021-03-11
Payer: COMMERCIAL

## 2021-03-11 VITALS
RESPIRATION RATE: 16 BRPM | DIASTOLIC BLOOD PRESSURE: 82 MMHG | OXYGEN SATURATION: 99 % | TEMPERATURE: 98 F | WEIGHT: 167 LBS | HEART RATE: 66 BPM | HEIGHT: 65.5 IN | SYSTOLIC BLOOD PRESSURE: 128 MMHG | BODY MASS INDEX: 27.49 KG/M2

## 2021-03-11 DIAGNOSIS — R91.8 PULMONARY INFILTRATE PRESENT ON COMPUTED TOMOGRAPHY: Primary | ICD-10-CM

## 2021-03-11 DIAGNOSIS — I10 ESSENTIAL HYPERTENSION, BENIGN: Chronic | ICD-10-CM

## 2021-03-11 DIAGNOSIS — Z86.16 HISTORY OF COVID-19: ICD-10-CM

## 2021-03-11 PROCEDURE — 99213 OFFICE O/P EST LOW 20 MIN: CPT | Performed by: INTERNAL MEDICINE

## 2021-03-11 PROCEDURE — 3008F BODY MASS INDEX DOCD: CPT | Performed by: INTERNAL MEDICINE

## 2021-03-11 PROCEDURE — 3079F DIAST BP 80-89 MM HG: CPT | Performed by: INTERNAL MEDICINE

## 2021-03-11 PROCEDURE — 3074F SYST BP LT 130 MM HG: CPT | Performed by: INTERNAL MEDICINE

## 2021-03-11 RX ORDER — METOPROLOL SUCCINATE 50 MG/1
TABLET, EXTENDED RELEASE ORAL
COMMUNITY
Start: 2021-03-11 | End: 2021-10-01

## 2021-03-11 NOTE — PROGRESS NOTES
Fadi Jernigan  1960 is a 62year old female who presents for upper respiratory symptoms    Patient presents with: Follow - Up        HPI:   Sporadic coughing.   Has CT scan scheduled for first week of April along with echocardiogram with a subsequ 98.2 °F (36.8 °C) (Oral)   Resp 16   Ht 5' 5.5\" (1.664 m)   Wt 167 lb (75.8 kg)   LMP 06/12/2015   SpO2 99%   BMI 27.37 kg/m²   GENERAL: well developed, well nourished,in no apparent distress  SKIN: no rashes,no suspicious lesions  EYES:PERRLA, EOMI intac

## 2021-03-15 ENCOUNTER — TELEPHONE (OUTPATIENT)
Dept: INTERNAL MEDICINE CLINIC | Facility: CLINIC | Age: 61
End: 2021-03-15

## 2021-03-15 NOTE — TELEPHONE ENCOUNTER
Patient needs RTW letter dated 3/11/21 faxed to The [facility] and to employer at below fax numbers:     [employer] at [phone]        [facility] [employer] @ [phone] -

## 2021-03-26 ENCOUNTER — HOSPITAL ENCOUNTER (OUTPATIENT)
Dept: CV DIAGNOSTICS | Age: 61
Discharge: HOME | End: 2021-03-26
Attending: INTERNAL MEDICINE
Payer: COMMERCIAL

## 2021-03-26 ENCOUNTER — HOSPITAL ENCOUNTER (OUTPATIENT)
Dept: BONE DENSITY | Age: 61
Discharge: HOME | End: 2021-03-26
Attending: INTERNAL MEDICINE
Payer: COMMERCIAL

## 2021-03-26 DIAGNOSIS — U07.1 PNEUMONIA DUE TO COVID-19 VIRUS: ICD-10-CM

## 2021-03-26 DIAGNOSIS — Z00.00 LABORATORY EXAMINATION ORDERED AS PART OF A ROUTINE GENERAL MEDICAL EXAMINATION: ICD-10-CM

## 2021-03-26 DIAGNOSIS — J12.82 PNEUMONIA DUE TO COVID-19 VIRUS: ICD-10-CM

## 2021-03-26 DIAGNOSIS — R06.02 SHORTNESS OF BREATH: ICD-10-CM

## 2021-03-26 DIAGNOSIS — R07.89 CHEST DISCOMFORT: ICD-10-CM

## 2021-03-26 PROCEDURE — 93306 TTE W/DOPPLER COMPLETE: CPT | Performed by: INTERNAL MEDICINE

## 2021-03-26 PROCEDURE — 77080 DXA BONE DENSITY AXIAL: CPT | Performed by: INTERNAL MEDICINE

## 2021-03-29 NOTE — PROGRESS NOTES
Lianet - good news. No evidence for covid related heart damage. Some mild chronic issues. You have something called diastolic dysfunction and mild pulm hypertension. Usually just requires good BP control. You can discuss further with Dr. Tyra Conn.     Elijah Hawley

## 2021-04-02 NOTE — TELEPHONE ENCOUNTER
Patient is requesting a separate letter for her 2nd job ([employer], attn: Yana Rosario) to be extended with RTW date of 4/19 since this job is not done remotely. Letter pending for your signature if appropriate.

## 2021-05-28 DIAGNOSIS — I10 ESSENTIAL HYPERTENSION, BENIGN: Chronic | ICD-10-CM

## 2021-05-29 RX ORDER — LOSARTAN POTASSIUM 50 MG/1
TABLET ORAL
Qty: 180 TABLET | Refills: 0 | Status: SHIPPED | OUTPATIENT
Start: 2021-05-29 | End: 2021-08-26

## 2021-05-29 RX ORDER — AMLODIPINE BESYLATE 10 MG/1
TABLET ORAL
Qty: 90 TABLET | Refills: 1 | Status: SHIPPED | OUTPATIENT
Start: 2021-05-29 | End: 2021-11-26

## 2021-05-29 RX ORDER — METOPROLOL SUCCINATE 100 MG/1
TABLET, EXTENDED RELEASE ORAL
Qty: 90 TABLET | Refills: 1 | Status: SHIPPED | OUTPATIENT
Start: 2021-05-29 | End: 2021-10-01 | Stop reason: ALTCHOICE

## 2021-05-29 NOTE — TELEPHONE ENCOUNTER
Passed protocol    Medication(s) to Refill:   Requested Prescriptions     Pending Prescriptions Disp Refills   • METOPROLOL SUCCINATE  MG Oral Tablet 24 Hr [Pharmacy Med Name: METOPROLOL ER SUCCINATE 100MG TABS] 90 tablet 1     Sig: TAKE 1 TABLET BY

## 2021-08-06 ENCOUNTER — TELEPHONE (OUTPATIENT)
Dept: INTERNAL MEDICINE CLINIC | Facility: CLINIC | Age: 61
End: 2021-08-06

## 2021-08-06 DIAGNOSIS — Z11.52 ENCOUNTER FOR SCREENING FOR COVID-19: Primary | ICD-10-CM

## 2021-08-06 NOTE — TELEPHONE ENCOUNTER
Patient requesting an order for a PCR covid test for traveling purposes.      Patient is leaving August 21st.     FYI

## 2021-08-10 NOTE — TELEPHONE ENCOUNTER
Pt notified that orders have been placed and since orders are for pcr test, results can take up to 72 hours for results.

## 2021-08-26 RX ORDER — LOSARTAN POTASSIUM 50 MG/1
TABLET ORAL
Qty: 180 TABLET | Refills: 0 | Status: SHIPPED | OUTPATIENT
Start: 2021-08-26 | End: 2021-11-26

## 2021-08-26 NOTE — TELEPHONE ENCOUNTER
Name from pharmacy: LOSARTAN 50MG TABLETS         Will file in chart as: LOSARTAN POTASSIUM 50 MG Oral Tab    Sig: TAKE 2 TABLETS(100 MG) BY MOUTH DAILY    Disp:  180 tablet    Refills:  0 (Pharmacy requested: Not specified)    Start: 8/26/2021    Class:

## 2021-10-01 ENCOUNTER — OFFICE VISIT (OUTPATIENT)
Dept: INTERNAL MEDICINE CLINIC | Facility: CLINIC | Age: 61
End: 2021-10-01
Payer: COMMERCIAL

## 2021-10-01 VITALS
BODY MASS INDEX: 26.83 KG/M2 | DIASTOLIC BLOOD PRESSURE: 72 MMHG | RESPIRATION RATE: 16 BRPM | HEIGHT: 65.5 IN | WEIGHT: 163 LBS | HEART RATE: 87 BPM | SYSTOLIC BLOOD PRESSURE: 134 MMHG | OXYGEN SATURATION: 98 % | TEMPERATURE: 99 F

## 2021-10-01 DIAGNOSIS — I10 ESSENTIAL HYPERTENSION, BENIGN: Primary | Chronic | ICD-10-CM

## 2021-10-01 DIAGNOSIS — Z00.00 LABORATORY EXAMINATION ORDERED AS PART OF A ROUTINE GENERAL MEDICAL EXAMINATION: ICD-10-CM

## 2021-10-01 PROCEDURE — 3078F DIAST BP <80 MM HG: CPT | Performed by: INTERNAL MEDICINE

## 2021-10-01 PROCEDURE — 99213 OFFICE O/P EST LOW 20 MIN: CPT | Performed by: INTERNAL MEDICINE

## 2021-10-01 PROCEDURE — 3008F BODY MASS INDEX DOCD: CPT | Performed by: INTERNAL MEDICINE

## 2021-10-01 PROCEDURE — 3075F SYST BP GE 130 - 139MM HG: CPT | Performed by: INTERNAL MEDICINE

## 2021-10-01 RX ORDER — METOPROLOL SUCCINATE 50 MG/1
TABLET, EXTENDED RELEASE ORAL
Qty: 90 TABLET | Refills: 0 | Status: SHIPPED | OUTPATIENT
Start: 2021-10-01 | End: 2022-02-07

## 2021-10-01 NOTE — PROGRESS NOTES
Ann Saha  1960 is a 62year old female. Patient presents with:   Follow - Up       HPI:     Current Outpatient Medications   Medication Sig Dispense Refill   • metoprolol succinate 50 MG Oral Tablet 24 Hr TAKE 1 TABLET BY MOUTH DAILY 90 tab 26.71 kg/m²   HEENT:   jvp not raised. Ear canals: normal.   Ear drums: normal .   Ears: unremarkable. Mouth: unremarkable. Nasal septum: midline. Pharynx: normal.   Sinuses: non-tender. HEART:   Clicks: no.   Distal Pulses Palpable: yes.    Edema

## 2021-10-04 ENCOUNTER — TELEPHONE (OUTPATIENT)
Dept: INTERNAL MEDICINE CLINIC | Facility: CLINIC | Age: 61
End: 2021-10-04

## 2021-10-04 NOTE — TELEPHONE ENCOUNTER
Patient brought [facility] paperwork---[employer] [place] to appointment. Paperwork completed and faxed to [employer] -[phone]. Confirmation was received . Patient was made aware and wants copy mailed.     Copy sent to scanning and copy placed in accord

## 2021-10-14 NOTE — TELEPHONE ENCOUNTER
Pt requesting to speak with mary regarding her [facility] paperwork. Pt stated that there were errors in the paperwork.      Car Crowell [phone]

## 2021-10-14 NOTE — TELEPHONE ENCOUNTER
Call placed to patient. Revision made , ( date correction ) faxed to [facility] with confirmation.

## 2021-10-28 ENCOUNTER — TELEPHONE (OUTPATIENT)
Dept: INTERNAL MEDICINE CLINIC | Facility: CLINIC | Age: 61
End: 2021-10-28

## 2021-10-28 DIAGNOSIS — E11.9 TYPE 2 DIABETES MELLITUS WITHOUT COMPLICATION, WITHOUT LONG-TERM CURRENT USE OF INSULIN (HCC): Chronic | ICD-10-CM

## 2021-10-28 NOTE — TELEPHONE ENCOUNTER
Pt needs Px was told to RTC in 3 weeks. Needs labs completed.     Metformin HCl 500 mg failed protocol due to  Diabetic Medication Protocol Failed 10/28/2021 11:15 AM   Protocol Details  HgBA1C procedure resulted in past 6 months    Last HgBA1C < 7.5   Fill

## 2021-11-01 NOTE — TELEPHONE ENCOUNTER
Appt scheduled - patient is out of metformin at this time. Temporary supply requested.   Pt to have fasting labs drawn Wednesday this week    [facility] #[id number]    Future Appointments   Date Time Provider Alexis To   11/9/2021  2:15 PM EMG 08 NURSE E

## 2021-11-09 ENCOUNTER — IMMUNIZATION (OUTPATIENT)
Dept: INTERNAL MEDICINE CLINIC | Facility: CLINIC | Age: 61
End: 2021-11-09
Payer: COMMERCIAL

## 2021-11-09 DIAGNOSIS — Z23 NEED FOR VACCINATION: Primary | ICD-10-CM

## 2021-11-09 PROCEDURE — 90471 IMMUNIZATION ADMIN: CPT | Performed by: INTERNAL MEDICINE

## 2021-11-09 PROCEDURE — 90686 IIV4 VACC NO PRSV 0.5 ML IM: CPT | Performed by: INTERNAL MEDICINE

## 2021-11-24 DIAGNOSIS — E78.00 PURE HYPERCHOLESTEROLEMIA: Chronic | ICD-10-CM

## 2021-11-24 DIAGNOSIS — I10 ESSENTIAL HYPERTENSION, BENIGN: Chronic | ICD-10-CM

## 2021-11-26 RX ORDER — LOSARTAN POTASSIUM 50 MG/1
TABLET ORAL
Qty: 180 TABLET | Refills: 0 | Status: SHIPPED | OUTPATIENT
Start: 2021-11-26

## 2021-11-26 RX ORDER — AMLODIPINE BESYLATE 10 MG/1
TABLET ORAL
Qty: 90 TABLET | Refills: 1 | Status: SHIPPED | OUTPATIENT
Start: 2021-11-26

## 2021-11-26 RX ORDER — ATORVASTATIN CALCIUM 20 MG/1
TABLET, FILM COATED ORAL
Qty: 90 TABLET | Refills: 1 | Status: SHIPPED | OUTPATIENT
Start: 2021-11-26

## 2021-11-26 NOTE — TELEPHONE ENCOUNTER
Passed protocol     Last refill:  LOSARTAN POTASSIUM 50 MG Oral Tab 180 tablet 0 8/26/2021    Sig:   TAKE 2 TABLETS(100 MG) BY MOUTH DAILY       AMLODIPINE BESYLATE 10 MG Oral Tab 90 tablet 1 5/29/2021    Sig:   TAKE 1 TABLET BY MOUTH DAILY       ATORVASTA

## 2021-12-28 NOTE — TELEPHONE ENCOUNTER
Refilled yesterday  diclofenac 50 MG Oral Tab EC 60 tablet 0 12/27/2021    Sig:   Take 1 tablet (50 mg total) by mouth 2 (two) times

## 2022-02-07 RX ORDER — METOPROLOL SUCCINATE 50 MG/1
TABLET, EXTENDED RELEASE ORAL
Qty: 90 TABLET | Refills: 0 | Status: SHIPPED | OUTPATIENT
Start: 2022-02-07

## 2022-03-11 NOTE — TELEPHONE ENCOUNTER
No Protocol     Requesting: diclofenac 50mg    LOV: 10/1/21   RTC: 6 weeks   Filled: 12/27/21 #60 0 refills   Recent Labs: 3/9/21     Upcoming OV: none scheduled 29

## 2022-04-12 ENCOUNTER — TELEPHONE (OUTPATIENT)
Dept: INTERNAL MEDICINE CLINIC | Facility: CLINIC | Age: 62
End: 2022-04-12

## 2022-04-12 NOTE — TELEPHONE ENCOUNTER
David Public with patient, wanted to make VM was aware there should be paperwork being faxed [facility] [address]. Expresses she still is not feeling well, has lingering symptoms from when she tested positive for covid last year. States she has wheezing and SOB that comes and goes with talking. This is unchanged from having covid. She is also concern with weight loss and changes in appetite. Patient states she is okay with keeping the appointment she has scheduled 4/21/22 just wanted to make sure VM knew about the paperwork. Patient instructed if symptoms worsen to proceed to urgent care or ER.

## 2022-04-21 ENCOUNTER — OFFICE VISIT (OUTPATIENT)
Dept: INTERNAL MEDICINE CLINIC | Facility: CLINIC | Age: 62
End: 2022-04-21
Payer: COMMERCIAL

## 2022-04-21 VITALS
HEART RATE: 68 BPM | SYSTOLIC BLOOD PRESSURE: 140 MMHG | WEIGHT: 158.81 LBS | DIASTOLIC BLOOD PRESSURE: 90 MMHG | OXYGEN SATURATION: 98 % | RESPIRATION RATE: 16 BRPM | HEIGHT: 65.5 IN | BODY MASS INDEX: 26.14 KG/M2 | TEMPERATURE: 98 F

## 2022-04-21 DIAGNOSIS — R91.8 PULMONARY INFILTRATE PRESENT ON COMPUTED TOMOGRAPHY: Primary | Chronic | ICD-10-CM

## 2022-04-21 DIAGNOSIS — E78.00 PURE HYPERCHOLESTEROLEMIA: Chronic | ICD-10-CM

## 2022-04-21 DIAGNOSIS — E11.9 TYPE 2 DIABETES MELLITUS WITHOUT COMPLICATION, WITHOUT LONG-TERM CURRENT USE OF INSULIN (HCC): Chronic | ICD-10-CM

## 2022-04-21 DIAGNOSIS — Z00.00 LABORATORY EXAMINATION ORDERED AS PART OF A ROUTINE GENERAL MEDICAL EXAMINATION: ICD-10-CM

## 2022-04-21 DIAGNOSIS — I10 ESSENTIAL HYPERTENSION, BENIGN: Chronic | ICD-10-CM

## 2022-04-21 PROBLEM — Z86.16 HISTORY OF COVID-19: Chronic | Status: ACTIVE | Noted: 2021-02-01

## 2022-04-21 PROCEDURE — 99214 OFFICE O/P EST MOD 30 MIN: CPT | Performed by: INTERNAL MEDICINE

## 2022-04-21 PROCEDURE — 3080F DIAST BP >= 90 MM HG: CPT | Performed by: INTERNAL MEDICINE

## 2022-04-21 PROCEDURE — 3077F SYST BP >= 140 MM HG: CPT | Performed by: INTERNAL MEDICINE

## 2022-04-21 PROCEDURE — 3008F BODY MASS INDEX DOCD: CPT | Performed by: INTERNAL MEDICINE

## 2022-04-21 RX ORDER — ATORVASTATIN CALCIUM 20 MG/1
20 TABLET, FILM COATED ORAL NIGHTLY
Qty: 90 TABLET | Refills: 1 | Status: SHIPPED | OUTPATIENT
Start: 2022-04-21

## 2022-04-21 RX ORDER — METOPROLOL SUCCINATE 50 MG/1
50 TABLET, EXTENDED RELEASE ORAL DAILY
Qty: 90 TABLET | Refills: 0 | Status: SHIPPED | OUTPATIENT
Start: 2022-04-21

## 2022-04-21 RX ORDER — LOSARTAN POTASSIUM 50 MG/1
100 TABLET ORAL DAILY
Qty: 180 TABLET | Refills: 0 | Status: SHIPPED | OUTPATIENT
Start: 2022-04-21

## 2022-04-21 RX ORDER — AMLODIPINE BESYLATE 10 MG/1
10 TABLET ORAL DAILY
Qty: 90 TABLET | Refills: 1 | Status: SHIPPED | OUTPATIENT
Start: 2022-04-21

## 2022-04-23 ENCOUNTER — TELEPHONE (OUTPATIENT)
Dept: INTERNAL MEDICINE CLINIC | Facility: CLINIC | Age: 62
End: 2022-04-23

## 2022-04-23 NOTE — TELEPHONE ENCOUNTER
Pt checking status on [facility] paperwork from the [place]. Pt was seen on 4/21 and dropped off paperwork. Advised Dr Helen Siegel is still working on paperwork. Pt requesting to have paperwork faxed to [phone] and is requesting a call once paperwork is faxed.      [phone] as best contact for pt

## 2022-04-27 NOTE — TELEPHONE ENCOUNTER
Pt called to check on the status of the FMLA form, pt stated form is due this Friday 04/29/22, pt is requesting to please ask dr. Ariana Klein if he can please expedite it by tomorrow.  Pt requesting to have paperwork faxed to [phone] and is requesting a call once paperwork is faxed, pt requested to please mail her the original.

## 2022-04-28 NOTE — TELEPHONE ENCOUNTER
[facility] paperwork faxed to the [place]   Confirmation received   Sent to scan and copy placed in [facility] accordion in pod#2

## 2022-05-11 ENCOUNTER — TELEPHONE (OUTPATIENT)
Dept: INTERNAL MEDICINE CLINIC | Facility: CLINIC | Age: 62
End: 2022-05-11

## 2022-05-11 NOTE — TELEPHONE ENCOUNTER
Received paperwork from [facility] that Dr Giacomo Oppenheim needs to fill out for the [facility] .  Placed in Dr Evelin Puri in [facility]

## 2022-05-16 ENCOUNTER — VIRTUAL PHONE E/M (OUTPATIENT)
Dept: INTERNAL MEDICINE CLINIC | Facility: CLINIC | Age: 62
End: 2022-05-16
Payer: COMMERCIAL

## 2022-05-16 DIAGNOSIS — R91.8 PULMONARY INFILTRATE PRESENT ON COMPUTED TOMOGRAPHY: Primary | Chronic | ICD-10-CM

## 2022-05-16 DIAGNOSIS — Z86.16 HISTORY OF COVID-19: Chronic | ICD-10-CM

## 2022-05-16 NOTE — PROGRESS NOTES
Virtual Telephone Check-In    Atul Monteiro verbally consents to a Virtual/Telephone Check-In visit on 05/16/22. Patient has been referred to the [facility] website at [email or web address] to review the yearly Consent to Treat document. Patient understands and accepts financial responsibility for any deductible, co-insurance and/or co-pays associated with this service.     Duration of the service: 15 minutes      Summary of topics discussed:   Family form filled out with patient    Diagnoses and all orders for this visit:    Pulmonary infiltrate present on computed tomography    History of COVID-19          Keke Madsen MD

## 2022-06-29 ENCOUNTER — TELEPHONE (OUTPATIENT)
Dept: INTERNAL MEDICINE CLINIC | Facility: CLINIC | Age: 62
End: 2022-06-29

## 2022-06-29 NOTE — TELEPHONE ENCOUNTER
Sonam faxed over a request for more informaion to be given on behalf of disability claim.   Placed in Dr Bahman Ash in basket for review

## 2022-07-09 ENCOUNTER — HOSPITAL ENCOUNTER (OUTPATIENT)
Dept: CT IMAGING | Age: 62
Discharge: HOME | End: 2022-07-09
Attending: INTERNAL MEDICINE
Payer: COMMERCIAL

## 2022-07-09 DIAGNOSIS — R06.02 SHORTNESS OF BREATH: ICD-10-CM

## 2022-07-09 DIAGNOSIS — J84.9 ILD (INTERSTITIAL LUNG DISEASE) (HCC): ICD-10-CM

## 2022-07-09 PROCEDURE — 71250 CT THORAX DX C-: CPT | Performed by: INTERNAL MEDICINE

## 2022-08-23 DIAGNOSIS — I10 ESSENTIAL HYPERTENSION, BENIGN: Chronic | ICD-10-CM

## 2022-08-24 RX ORDER — METOPROLOL SUCCINATE 50 MG/1
50 TABLET, EXTENDED RELEASE ORAL DAILY
Qty: 90 TABLET | Refills: 0 | Status: SHIPPED | OUTPATIENT
Start: 2022-08-24

## 2022-08-24 NOTE — TELEPHONE ENCOUNTER
Called pt to notify her that she is overdue for lab work. Pt is aware she will need to fast and to either walk in or call CS to complete labs. I told pt to please complete before any further refills, that this will be the last refill. Metoprolol ER 50 mg  Hypertension Medications Protocol Failed 08/23/2022 06:09 PM   Protocol Details  CMP or BMP in past 12 months   Filled 4-21-22  Qty 90  0 refills  No upcoming appt.   LOV 4-21-22 VM

## 2022-11-01 DIAGNOSIS — I10 ESSENTIAL HYPERTENSION, BENIGN: Chronic | ICD-10-CM

## 2022-11-01 RX ORDER — AMLODIPINE BESYLATE 10 MG/1
TABLET ORAL
Qty: 90 TABLET | Refills: 1 | Status: SHIPPED | OUTPATIENT
Start: 2022-11-01

## 2022-12-12 ENCOUNTER — TELEPHONE (OUTPATIENT)
Dept: INTERNAL MEDICINE CLINIC | Facility: CLINIC | Age: 62
End: 2022-12-12

## 2022-12-12 DIAGNOSIS — Z00.00 ROUTINE GENERAL MEDICAL EXAMINATION AT A HEALTH CARE FACILITY: ICD-10-CM

## 2022-12-12 DIAGNOSIS — Z12.39 ENCOUNTER FOR SCREENING FOR MALIGNANT NEOPLASM OF BREAST, UNSPECIFIED SCREENING MODALITY: Primary | ICD-10-CM

## 2022-12-12 DIAGNOSIS — Z12.39 BREAST CANCER SCREENING OTHER THAN MAMMOGRAM: ICD-10-CM

## 2022-12-12 NOTE — TELEPHONE ENCOUNTER
Pt requesting order for mammogram   Please advise     Future Appointments   Date Time Provider Alexis To   1/3/2023 12:00 PM Tamanna Rnee MD EMG 8 [facility] [place]

## 2023-01-03 DIAGNOSIS — I10 ESSENTIAL HYPERTENSION, BENIGN: Chronic | ICD-10-CM

## 2023-01-04 DIAGNOSIS — I10 ESSENTIAL HYPERTENSION, BENIGN: Chronic | ICD-10-CM

## 2023-01-04 RX ORDER — METOPROLOL SUCCINATE 50 MG/1
TABLET, EXTENDED RELEASE ORAL
Qty: 30 TABLET | Refills: 0 | Status: SHIPPED | OUTPATIENT
Start: 2023-01-04 | End: 2023-01-06

## 2023-01-04 NOTE — TELEPHONE ENCOUNTER
Called patient and informed her to complete labs   Patient will complete on Friday prior to appt       Protocol failed     Requesting: metoprolol succinate ER 50mg     LOV: 4/21/22   RTC: 4 weeks   Filled: 8/24/22 # 90 0 refills   Recent Labs: 3/9/21     Upcoming OV: 1/6/23

## 2023-01-05 RX ORDER — METOPROLOL SUCCINATE 50 MG/1
TABLET, EXTENDED RELEASE ORAL
Qty: 90 TABLET | Refills: 0 | OUTPATIENT
Start: 2023-01-05

## 2023-01-06 ENCOUNTER — LAB ENCOUNTER (OUTPATIENT)
Dept: LAB | Age: 63
End: 2023-01-06
Attending: INTERNAL MEDICINE
Payer: COMMERCIAL

## 2023-01-06 ENCOUNTER — HOSPITAL ENCOUNTER (OUTPATIENT)
Dept: MAMMOGRAPHY | Age: 63
Discharge: HOME | End: 2023-01-06
Attending: INTERNAL MEDICINE
Payer: COMMERCIAL

## 2023-01-06 ENCOUNTER — OFFICE VISIT (OUTPATIENT)
Dept: INTERNAL MEDICINE CLINIC | Facility: CLINIC | Age: 63
End: 2023-01-06
Payer: COMMERCIAL

## 2023-01-06 VITALS
OXYGEN SATURATION: 99 % | BODY MASS INDEX: 28.31 KG/M2 | RESPIRATION RATE: 16 BRPM | WEIGHT: 172 LBS | DIASTOLIC BLOOD PRESSURE: 80 MMHG | TEMPERATURE: 98 F | HEART RATE: 82 BPM | SYSTOLIC BLOOD PRESSURE: 130 MMHG | HEIGHT: 65.5 IN

## 2023-01-06 DIAGNOSIS — E78.00 PURE HYPERCHOLESTEROLEMIA: Chronic | ICD-10-CM

## 2023-01-06 DIAGNOSIS — Z00.00 LABORATORY EXAMINATION ORDERED AS PART OF A ROUTINE GENERAL MEDICAL EXAMINATION: ICD-10-CM

## 2023-01-06 DIAGNOSIS — M51.16 LUMBAR DISC DISEASE WITH RADICULOPATHY: Primary | ICD-10-CM

## 2023-01-06 DIAGNOSIS — Z00.00 ROUTINE GENERAL MEDICAL EXAMINATION AT A HEALTH CARE FACILITY: ICD-10-CM

## 2023-01-06 DIAGNOSIS — E08.00 DIABETES MELLITUS DUE TO UNDERLYING CONDITION WITH HYPEROSMOLARITY WITHOUT COMA, WITHOUT LONG-TERM CURRENT USE OF INSULIN (HCC): ICD-10-CM

## 2023-01-06 DIAGNOSIS — I10 ESSENTIAL HYPERTENSION, BENIGN: Chronic | ICD-10-CM

## 2023-01-06 DIAGNOSIS — M23.92 INTERNAL DERANGEMENT OF LEFT KNEE: ICD-10-CM

## 2023-01-06 PROBLEM — Z86.16 HISTORY OF COVID-19: Chronic | Status: RESOLVED | Noted: 2021-02-01 | Resolved: 2023-01-06

## 2023-01-06 LAB
ALBUMIN SERPL-MCNC: 3.6 G/DL (ref 3.4–5)
ALBUMIN/GLOB SERPL: 0.8 {RATIO} (ref 1–2)
ALP LIVER SERPL-CCNC: 95 U/L
ALT SERPL-CCNC: 21 U/L
ANION GAP SERPL CALC-SCNC: 4 MMOL/L (ref 0–18)
AST SERPL-CCNC: 22 U/L (ref 15–37)
BASOPHILS # BLD AUTO: 0.05 X10(3) UL (ref 0–0.2)
BASOPHILS NFR BLD AUTO: 0.9 %
BILIRUB SERPL-MCNC: 0.6 MG/DL (ref 0.1–2)
BILIRUB UR QL STRIP.AUTO: NEGATIVE
BUN BLD-MCNC: 17 MG/DL (ref 7–18)
CALCIUM BLD-MCNC: 9.3 MG/DL (ref 8.5–10.1)
CHLORIDE SERPL-SCNC: 107 MMOL/L (ref 98–112)
CHOLEST SERPL-MCNC: 242 MG/DL (ref ?–200)
CLARITY UR REFRACT.AUTO: CLEAR
CO2 SERPL-SCNC: 29 MMOL/L (ref 21–32)
COLOR UR AUTO: YELLOW
CREAT BLD-MCNC: 1 MG/DL
CREAT UR-SCNC: 119 MG/DL
EOSINOPHIL # BLD AUTO: 0.2 X10(3) UL (ref 0–0.7)
EOSINOPHIL NFR BLD AUTO: 3.4 %
ERYTHROCYTE [DISTWIDTH] IN BLOOD BY AUTOMATED COUNT: 14.4 %
EST. AVERAGE GLUCOSE BLD GHB EST-MCNC: 131 MG/DL (ref 68–126)
FASTING PATIENT LIPID ANSWER: YES
FASTING STATUS PATIENT QL REPORTED: YES
GFR SERPLBLD BASED ON 1.73 SQ M-ARVRAT: 64 ML/MIN/1.73M2 (ref 60–?)
GLOBULIN PLAS-MCNC: 4.5 G/DL (ref 2.8–4.4)
GLUCOSE BLD-MCNC: 100 MG/DL (ref 70–99)
GLUCOSE UR STRIP.AUTO-MCNC: NEGATIVE MG/DL
HBA1C MFR BLD: 6.2 % (ref ?–5.7)
HCT VFR BLD AUTO: 41 %
HDLC SERPL-MCNC: 64 MG/DL (ref 40–59)
HGB BLD-MCNC: 13.1 G/DL
IMM GRANULOCYTES # BLD AUTO: 0.01 X10(3) UL (ref 0–1)
IMM GRANULOCYTES NFR BLD: 0.2 %
KETONES UR STRIP.AUTO-MCNC: NEGATIVE MG/DL
LDLC SERPL CALC-MCNC: 169 MG/DL (ref ?–100)
LEUKOCYTE ESTERASE UR QL STRIP.AUTO: NEGATIVE
LYMPHOCYTES # BLD AUTO: 2.67 X10(3) UL (ref 1–4)
LYMPHOCYTES NFR BLD AUTO: 45.9 %
MCH RBC QN AUTO: 29.4 PG (ref 26–34)
MCHC RBC AUTO-ENTMCNC: 32 G/DL (ref 31–37)
MCV RBC AUTO: 91.9 FL
MICROALBUMIN UR-MCNC: 1.09 MG/DL
MICROALBUMIN/CREAT 24H UR-RTO: 9.2 UG/MG (ref ?–30)
MONOCYTES # BLD AUTO: 0.85 X10(3) UL (ref 0.1–1)
MONOCYTES NFR BLD AUTO: 14.6 %
NEUTROPHILS # BLD AUTO: 2.04 X10 (3) UL (ref 1.5–7.7)
NEUTROPHILS # BLD AUTO: 2.04 X10(3) UL (ref 1.5–7.7)
NEUTROPHILS NFR BLD AUTO: 35 %
NITRITE UR QL STRIP.AUTO: NEGATIVE
NONHDLC SERPL-MCNC: 178 MG/DL (ref ?–130)
OSMOLALITY SERPL CALC.SUM OF ELEC: 292 MOSM/KG (ref 275–295)
PH UR STRIP.AUTO: 7.5 [PH] (ref 5–8)
PLATELET # BLD AUTO: 284 10(3)UL (ref 150–450)
POTASSIUM SERPL-SCNC: 3.8 MMOL/L (ref 3.5–5.1)
PROT SERPL-MCNC: 8.1 G/DL (ref 6.4–8.2)
PROT UR STRIP.AUTO-MCNC: NEGATIVE MG/DL
RBC # BLD AUTO: 4.46 X10(6)UL
RBC UR QL AUTO: NEGATIVE
SODIUM SERPL-SCNC: 140 MMOL/L (ref 136–145)
SP GR UR STRIP.AUTO: 1.01 (ref 1–1.03)
TRIGL SERPL-MCNC: 54 MG/DL (ref 30–149)
TSI SER-ACNC: 1.23 MIU/ML (ref 0.36–3.74)
UROBILINOGEN UR STRIP.AUTO-MCNC: 0.2 MG/DL
VIT D+METAB SERPL-MCNC: 27.3 NG/ML (ref 30–100)
VLDLC SERPL CALC-MCNC: 11 MG/DL (ref 0–30)
WBC # BLD AUTO: 5.8 X10(3) UL (ref 4–11)

## 2023-01-06 PROCEDURE — 82306 VITAMIN D 25 HYDROXY: CPT | Performed by: INTERNAL MEDICINE

## 2023-01-06 PROCEDURE — 3075F SYST BP GE 130 - 139MM HG: CPT | Performed by: INTERNAL MEDICINE

## 2023-01-06 PROCEDURE — 83036 HEMOGLOBIN GLYCOSYLATED A1C: CPT | Performed by: INTERNAL MEDICINE

## 2023-01-06 PROCEDURE — 82570 ASSAY OF URINE CREATININE: CPT | Performed by: INTERNAL MEDICINE

## 2023-01-06 PROCEDURE — 3008F BODY MASS INDEX DOCD: CPT | Performed by: INTERNAL MEDICINE

## 2023-01-06 PROCEDURE — 3079F DIAST BP 80-89 MM HG: CPT | Performed by: INTERNAL MEDICINE

## 2023-01-06 PROCEDURE — 3061F NEG MICROALBUMINURIA REV: CPT | Performed by: INTERNAL MEDICINE

## 2023-01-06 PROCEDURE — 77067 SCR MAMMO BI INCL CAD: CPT | Performed by: INTERNAL MEDICINE

## 2023-01-06 PROCEDURE — 3044F HG A1C LEVEL LT 7.0%: CPT | Performed by: INTERNAL MEDICINE

## 2023-01-06 PROCEDURE — 80050 GENERAL HEALTH PANEL: CPT | Performed by: INTERNAL MEDICINE

## 2023-01-06 PROCEDURE — 82043 UR ALBUMIN QUANTITATIVE: CPT | Performed by: INTERNAL MEDICINE

## 2023-01-06 PROCEDURE — 77063 BREAST TOMOSYNTHESIS BI: CPT | Performed by: INTERNAL MEDICINE

## 2023-01-06 PROCEDURE — 80061 LIPID PANEL: CPT | Performed by: INTERNAL MEDICINE

## 2023-01-06 PROCEDURE — 99214 OFFICE O/P EST MOD 30 MIN: CPT | Performed by: INTERNAL MEDICINE

## 2023-01-06 PROCEDURE — 81003 URINALYSIS AUTO W/O SCOPE: CPT | Performed by: INTERNAL MEDICINE

## 2023-01-06 RX ORDER — METOPROLOL SUCCINATE 50 MG/1
50 TABLET, EXTENDED RELEASE ORAL DAILY
Qty: 30 TABLET | Refills: 0 | Status: SHIPPED | OUTPATIENT
Start: 2023-01-06 | End: 2023-01-09

## 2023-01-06 RX ORDER — FLUTICASONE PROPIONATE AND SALMETEROL 500; 50 UG/1; UG/1
1 POWDER RESPIRATORY (INHALATION) 2 TIMES DAILY
COMMUNITY
Start: 2022-06-15

## 2023-01-06 RX ORDER — ATORVASTATIN CALCIUM 20 MG/1
20 TABLET, FILM COATED ORAL NIGHTLY
Qty: 90 TABLET | Refills: 1 | Status: SHIPPED | OUTPATIENT
Start: 2023-01-06

## 2023-01-06 RX ORDER — AMLODIPINE BESYLATE 10 MG/1
10 TABLET ORAL DAILY
Qty: 90 TABLET | Refills: 1 | Status: SHIPPED | OUTPATIENT
Start: 2023-01-06

## 2023-01-08 DIAGNOSIS — I10 ESSENTIAL HYPERTENSION, BENIGN: Chronic | ICD-10-CM

## 2023-01-09 RX ORDER — METOPROLOL SUCCINATE 50 MG/1
TABLET, EXTENDED RELEASE ORAL
Qty: 90 TABLET | Refills: 0 | Status: SHIPPED | OUTPATIENT
Start: 2023-01-09

## 2023-01-09 NOTE — TELEPHONE ENCOUNTER
Protocol passed     Requesting: metoprolol succinate ER 50mg     LOV:1/6/23   RTC: 3 weeks   Filled: 1/6/23 # 30 0 refills   Recent Labs: 1/6/23     Upcoming OV: 1/26/23

## 2023-01-26 ENCOUNTER — HOSPITAL ENCOUNTER (OUTPATIENT)
Dept: GENERAL RADIOLOGY | Age: 63
Discharge: HOME | End: 2023-01-26
Attending: INTERNAL MEDICINE
Payer: COMMERCIAL

## 2023-01-26 ENCOUNTER — OFFICE VISIT (OUTPATIENT)
Dept: INTERNAL MEDICINE CLINIC | Facility: CLINIC | Age: 63
End: 2023-01-26
Payer: COMMERCIAL

## 2023-01-26 ENCOUNTER — TELEPHONE (OUTPATIENT)
Dept: INTERNAL MEDICINE CLINIC | Facility: CLINIC | Age: 63
End: 2023-01-26

## 2023-01-26 VITALS
SYSTOLIC BLOOD PRESSURE: 120 MMHG | TEMPERATURE: 98 F | HEIGHT: 65.5 IN | HEART RATE: 64 BPM | RESPIRATION RATE: 16 BRPM | WEIGHT: 170.81 LBS | OXYGEN SATURATION: 100 % | DIASTOLIC BLOOD PRESSURE: 82 MMHG | BODY MASS INDEX: 28.12 KG/M2

## 2023-01-26 DIAGNOSIS — E78.00 PURE HYPERCHOLESTEROLEMIA: Chronic | ICD-10-CM

## 2023-01-26 DIAGNOSIS — Z00.00 ROUTINE GENERAL MEDICAL EXAMINATION AT A HEALTH CARE FACILITY: Primary | ICD-10-CM

## 2023-01-26 DIAGNOSIS — R05.2 SUBACUTE COUGH: ICD-10-CM

## 2023-01-26 DIAGNOSIS — E08.00 DIABETES MELLITUS DUE TO UNDERLYING CONDITION WITH HYPEROSMOLARITY WITHOUT COMA, WITHOUT LONG-TERM CURRENT USE OF INSULIN (HCC): Chronic | ICD-10-CM

## 2023-01-26 DIAGNOSIS — I10 ESSENTIAL HYPERTENSION, BENIGN: Chronic | ICD-10-CM

## 2023-01-26 PROCEDURE — 71046 X-RAY EXAM CHEST 2 VIEWS: CPT | Performed by: INTERNAL MEDICINE

## 2023-01-26 PROCEDURE — 99396 PREV VISIT EST AGE 40-64: CPT | Performed by: INTERNAL MEDICINE

## 2023-01-26 PROCEDURE — 93000 ELECTROCARDIOGRAM COMPLETE: CPT | Performed by: INTERNAL MEDICINE

## 2023-01-26 PROCEDURE — 3079F DIAST BP 80-89 MM HG: CPT | Performed by: INTERNAL MEDICINE

## 2023-01-26 PROCEDURE — 3008F BODY MASS INDEX DOCD: CPT | Performed by: INTERNAL MEDICINE

## 2023-01-26 PROCEDURE — 3074F SYST BP LT 130 MM HG: CPT | Performed by: INTERNAL MEDICINE

## 2023-01-26 RX ORDER — CODEINE PHOSPHATE AND GUAIFENESIN 10; 100 MG/5ML; MG/5ML
5 SOLUTION ORAL EVERY 6 HOURS PRN
Qty: 240 ML | Refills: 0 | Status: SHIPPED | OUTPATIENT
Start: 2023-01-26 | End: 2023-02-05

## 2023-01-26 RX ORDER — CHOLECALCIFEROL (VITAMIN D3) 50 MCG
1 TABLET ORAL DAILY
Qty: 90 TABLET | Refills: 3 | Status: SHIPPED | OUTPATIENT
Start: 2023-01-26 | End: 2023-04-26

## 2023-01-26 RX ORDER — METOPROLOL SUCCINATE 50 MG/1
50 TABLET, EXTENDED RELEASE ORAL DAILY
Qty: 90 TABLET | Refills: 0 | Status: SHIPPED | OUTPATIENT
Start: 2023-01-26

## 2023-01-26 RX ORDER — AZITHROMYCIN 250 MG/1
TABLET, FILM COATED ORAL
Qty: 6 TABLET | Refills: 0 | Status: SHIPPED | OUTPATIENT
Start: 2023-01-26 | End: 2023-01-31

## 2023-01-26 RX ORDER — ALBUTEROL SULFATE 90 UG/1
2 AEROSOL, METERED RESPIRATORY (INHALATION) EVERY 6 HOURS PRN
Qty: 8 G | Refills: 0 | Status: SHIPPED | OUTPATIENT
Start: 2023-01-26 | End: 2023-02-25

## 2023-01-26 NOTE — TELEPHONE ENCOUNTER
Pt has CPX scheduled for later today. Also experiencing cough, runny nose. No Covid testing has been done prior to appt.      FYI

## 2023-01-29 LAB
ATRIAL RATE: 60 BPM
P AXIS: 62 DEGREES
P-R INTERVAL: 246 MS
Q-T INTERVAL: 442 MS
QRS DURATION: 102 MS
QTC CALCULATION (BEZET): 442 MS
R AXIS: -39 DEGREES
T AXIS: 69 DEGREES
VENTRICULAR RATE: 60 BPM

## 2023-01-30 RX ORDER — LOSARTAN POTASSIUM 50 MG/1
TABLET ORAL
Qty: 180 TABLET | Refills: 0 | Status: SHIPPED | OUTPATIENT
Start: 2023-01-30

## 2023-02-02 ENCOUNTER — TELEPHONE (OUTPATIENT)
Dept: INTERNAL MEDICINE CLINIC | Facility: CLINIC | Age: 63
End: 2023-02-02

## 2023-02-02 NOTE — TELEPHONE ENCOUNTER
Pt requesting cb due to nose bleeding. Pt says bleeding started when she woke up and it is on and off periodically. Pt says she feels \"weird\" and can smell and taste blood. F/u to discuss furthermore.

## 2023-02-02 NOTE — TELEPHONE ENCOUNTER
Spoke to patient she woke up thinking her nose was running but when she wiped it she saw blood. She then blew her nose slightly and there was more blood. She states she woke up feeling \"weird\" dizzy a bit. Patient does have heat on as well as fire place heater in the room. Advised patient could be to dry in room. Patient does not have a humidifier. She asked if high blood pressure could be the cause. Advised it could be the case, she denied having a headache. She does not check her blood pressure. She denied soaking tissues with blood, denied any sores in nose. The nose bleed was lightening up during call. She only saw blood when she dapped or blew her nose. Pt was advised to use a humidifier, or boil water for stem. She was advised to call back if she started soaking tissue, developed a headache or dizziness. Pt verbalized understanding.

## 2023-02-07 ENCOUNTER — TELEPHONE (OUTPATIENT)
Dept: INTERNAL MEDICINE CLINIC | Facility: CLINIC | Age: 63
End: 2023-02-07

## 2023-02-07 NOTE — TELEPHONE ENCOUNTER
Rachel/[facility]   - [phone]  F [phone]    Orders needed for MRI Knee, Left and MRI Spine  Faxed to number above

## 2023-02-09 ENCOUNTER — TELEPHONE (OUTPATIENT)
Dept: INTERNAL MEDICINE CLINIC | Facility: CLINIC | Age: 63
End: 2023-02-09

## 2023-02-09 NOTE — TELEPHONE ENCOUNTER
Spoke to pt - discussed nose bleed that pt reported to [facility] on 2/2/23. Pt stated since then, there have been no additional nose bleeds until today. Nose bleed has stopped, it does not soak a tissue. Encouraged pt to try saline nasal spray or perhaps a small amount of Vaseline. Pt v/u. If bleeding worsens, pt will call to schedule an appointment. Discussed with pt that she is leaning towards having the MRI's at [facility] because they have an open MRI. Pt does not know if they have obtained authorization yet, but she has an appt with them on 2/15 (Juma on 2/13). Pt is to call Wenonah Fabiana to confirm if they have authorization for her MRI's yet.

## 2023-02-09 NOTE — TELEPHONE ENCOUNTER
Pt requesting cb from nurse. Pt c/o back/leg pain and nose bleeds. Pt unsure if she should come into office or wait until she has MRI done, requesting medical advice.

## 2023-02-10 NOTE — TELEPHONE ENCOUNTER
Pt stated per insurance they need clinical notes that prove why she needs these MRI's done. Precert required for insurance. [phone]. Phone: [phone].      Tracking #:[id number]

## 2023-02-10 NOTE — TELEPHONE ENCOUNTER
Authorization's for MRI's are taken care of by our referral department. MRI is currently pending authorization and once authorized, we will reach out to pt and fax to [facility]. Pt aware and states that she reached out to Tracee Bolton and they stated her PCP need to send OV notes for referral to be worked. Message sent to referral department.

## 2023-02-24 ENCOUNTER — TELEPHONE (OUTPATIENT)
Dept: INTERNAL MEDICINE CLINIC | Facility: CLINIC | Age: 63
End: 2023-02-24

## 2023-02-27 ENCOUNTER — TELEPHONE (OUTPATIENT)
Dept: ORTHOPEDICS CLINIC | Facility: CLINIC | Age: 63
End: 2023-02-27

## 2023-02-27 ENCOUNTER — OFFICE VISIT (OUTPATIENT)
Dept: INTERNAL MEDICINE CLINIC | Facility: CLINIC | Age: 63
End: 2023-02-27
Payer: COMMERCIAL

## 2023-02-27 ENCOUNTER — TELEPHONE (OUTPATIENT)
Dept: INTERNAL MEDICINE CLINIC | Facility: CLINIC | Age: 63
End: 2023-02-27

## 2023-02-27 VITALS
BODY MASS INDEX: 27.16 KG/M2 | HEART RATE: 83 BPM | DIASTOLIC BLOOD PRESSURE: 72 MMHG | RESPIRATION RATE: 16 BRPM | HEIGHT: 65.5 IN | WEIGHT: 165 LBS | SYSTOLIC BLOOD PRESSURE: 122 MMHG | OXYGEN SATURATION: 99 % | TEMPERATURE: 98 F

## 2023-02-27 DIAGNOSIS — M25.562 LEFT KNEE PAIN, UNSPECIFIED CHRONICITY: ICD-10-CM

## 2023-02-27 DIAGNOSIS — Z01.89 ENCOUNTER FOR LOWER EXTREMITY COMPARISON IMAGING STUDY: Primary | ICD-10-CM

## 2023-02-27 DIAGNOSIS — M23.92 INTERNAL DERANGEMENT OF LEFT KNEE: Primary | ICD-10-CM

## 2023-02-27 PROCEDURE — 3078F DIAST BP <80 MM HG: CPT | Performed by: INTERNAL MEDICINE

## 2023-02-27 PROCEDURE — 99213 OFFICE O/P EST LOW 20 MIN: CPT | Performed by: INTERNAL MEDICINE

## 2023-02-27 PROCEDURE — 3074F SYST BP LT 130 MM HG: CPT | Performed by: INTERNAL MEDICINE

## 2023-02-27 PROCEDURE — 3008F BODY MASS INDEX DOCD: CPT | Performed by: INTERNAL MEDICINE

## 2023-02-27 RX ORDER — FLUTICASONE FUROATE AND VILANTEROL 200; 25 UG/1; UG/1
1 POWDER RESPIRATORY (INHALATION) DAILY
Qty: 1 EACH | Refills: 0 | Status: SHIPPED | OUTPATIENT
Start: 2023-02-27

## 2023-02-27 NOTE — TELEPHONE ENCOUNTER
VM - pt notified, she still wants to keep appt today at 1:45 PM. FYI    Spoke to pt, gave her results, recommendations and contact information for Dr. Arsh Duncan. Pt v/u. Pt has appt today, wants to keep appt:     Future Appointments   Date Time Provider Alexis To   2/27/2023  1:45 PM Tracie Hernández MD [facility] 8 [facility] [place]

## 2023-02-27 NOTE — PATIENT INSTRUCTIONS
Pictorial discussion of the patient's left knee was done.   Reviewed patient's records about her pulmonary function as well as her cough that she has been seeing Dr. Anjali Leyva for

## 2023-02-27 NOTE — TELEPHONE ENCOUNTER
Patient is scheduled with Dr. Virgie Tolbert for left knee pain. Please advise if imaging is needed.

## 2023-03-09 ENCOUNTER — OFFICE VISIT (OUTPATIENT)
Dept: ORTHOPEDICS CLINIC | Facility: CLINIC | Age: 63
End: 2023-03-09

## 2023-03-09 VITALS — HEIGHT: 66 IN | WEIGHT: 170 LBS | BODY MASS INDEX: 27.32 KG/M2

## 2023-03-09 DIAGNOSIS — S83.232A COMPLEX TEAR OF MEDIAL MENISCUS OF LEFT KNEE AS CURRENT INJURY, INITIAL ENCOUNTER: Primary | ICD-10-CM

## 2023-03-15 ENCOUNTER — TELEPHONE (OUTPATIENT)
Dept: INTERNAL MEDICINE CLINIC | Facility: CLINIC | Age: 63
End: 2023-03-15

## 2023-03-15 NOTE — TELEPHONE ENCOUNTER
----- Message from Ronni Billingsley MD sent at 3/15/2023  9:28 AM CDT -----  Records reviewed  for cough  Set up tel visit   ----- Message -----  From: James Barlow  Sent: 2/27/2023   1:55 PM CDT  To: Ronni Billingsley MD    Review lungs

## 2023-03-15 NOTE — TELEPHONE ENCOUNTER
Future Appointments   Date Time Provider Alexis To   3/17/2023  9:30 AM Florinda Dumont MD [facility] 8 [facility] [place]

## 2023-03-15 NOTE — TELEPHONE ENCOUNTER
----- Message from Gemini Espinoza MD sent at 3/15/2023  9:28 AM CDT -----  Records reviewed  for cough  Set up tel visit   ----- Message -----  From: James Issa  Sent: 2/27/2023   1:55 PM CDT  To: Gemini Espinoza MD    Review lungs

## 2023-03-17 ENCOUNTER — VIRTUAL PHONE E/M (OUTPATIENT)
Dept: INTERNAL MEDICINE CLINIC | Facility: CLINIC | Age: 63
End: 2023-03-17

## 2023-03-17 DIAGNOSIS — R91.8 PULMONARY INFILTRATE PRESENT ON COMPUTED TOMOGRAPHY: Primary | Chronic | ICD-10-CM

## 2023-03-17 DIAGNOSIS — Z86.16 HISTORY OF COVID-19: ICD-10-CM

## 2023-03-17 PROCEDURE — 99213 OFFICE O/P EST LOW 20 MIN: CPT | Performed by: INTERNAL MEDICINE

## 2023-03-17 NOTE — PROGRESS NOTES
Virtual Telephone Check-In    Smith Shar verbally consents to a Virtual/Telephone Check-In visit on 03/17/23. Patient has been referred to the [facility] website at [email or web address] to review the yearly Consent to Treat document. Patient understands and accepts financial responsibility for any deductible, co-insurance and/or co-pays associated with this service. Duration of the service: 11 minutes      Summary of topics discussed:  F/u cough  Did review records per pt request  PFT normal  Old ct chest reviewed  Pt had been advised rpt ct chest per pulm  Will proceed with the same          Diagnoses and all orders for this visit:    Pulmonary infiltrate present on computed tomography    History of COVID-19  -     CT CHEST (CPT=71250);  Future      Did see ortho for knee-adv surgery   Awaiting ins clarity    Byron Lacey MD

## 2023-06-20 DIAGNOSIS — E11.9 TYPE 2 DIABETES MELLITUS WITHOUT COMPLICATION, WITHOUT LONG-TERM CURRENT USE OF INSULIN (HCC): Chronic | ICD-10-CM

## 2023-06-22 NOTE — TELEPHONE ENCOUNTER
Metformin 500 mg- Too early for refill  Filled 4-21-22  Qty 180  1 refill  No upcoming appt.   LOV 1-26-23 VM

## 2023-07-22 DIAGNOSIS — I10 ESSENTIAL HYPERTENSION, BENIGN: Chronic | ICD-10-CM

## 2023-07-24 RX ORDER — AMLODIPINE BESYLATE 10 MG/1
10 TABLET ORAL DAILY
Qty: 90 TABLET | Refills: 1 | Status: SHIPPED | OUTPATIENT
Start: 2023-07-24

## 2023-07-24 RX ORDER — LOSARTAN POTASSIUM 50 MG/1
TABLET ORAL
Qty: 180 TABLET | Refills: 1 | Status: SHIPPED | OUTPATIENT
Start: 2023-07-24

## 2023-07-24 NOTE — TELEPHONE ENCOUNTER
Passed protocol     Last refill:  LOSARTAN 50 MG Oral Tab 180 tablet 0 1/30/2023    Sig:   TAKE 2 TABLETS(100 MG) BY MOUTH DAILY       Medication Quantity Refills Start End   amLODIPine 10 MG Oral Tab 90 tablet 1 1/6/2023    Sig:   Take 1 tablet (10 mg total) by mouth daily.        LOV:   02/27/2023 Dr Sherry Carolina RTC  No FOV scheduled

## 2023-08-05 DIAGNOSIS — I10 ESSENTIAL HYPERTENSION, BENIGN: Chronic | ICD-10-CM

## 2023-08-07 RX ORDER — METOPROLOL SUCCINATE 50 MG/1
50 TABLET, EXTENDED RELEASE ORAL DAILY
Qty: 90 TABLET | Refills: 0 | Status: SHIPPED | OUTPATIENT
Start: 2023-08-07

## 2023-10-19 ENCOUNTER — TELEPHONE (OUTPATIENT)
Dept: INTERNAL MEDICINE CLINIC | Facility: CLINIC | Age: 63
End: 2023-10-19

## 2023-10-19 NOTE — TELEPHONE ENCOUNTER
Patient had covid shot 2 weeks ago and for the last week she's been having a rash in the back of her neck. She also has a lump on her neck. She'd like to speak to a nurse. Appt scheduled tentatively.   Future Appointments   Date Time Provider Alexis To   11/14/2023  5:00 PM Tristan Maynard MD [facility] 8 [facility] [place]

## 2023-10-19 NOTE — TELEPHONE ENCOUNTER
Spoke with pt. Pt stated about 2 weeks ago she got 3 shots in one week. Flu & Tdap in one day, then a few days later Covid vaccine. About a week after vaccines she developed a bump on the back of her neck that was red, raised and slightly itchy. A few days later pt stated she had 3 bumps on the back of her neck. She stated they kind of burn. She stated they seem to be getting smaller. NO puss or discharge from bumps. Yesterday she noticed a swollen lymph node on the side of her neck. Informed pt it could be an immune response to the 3 vaccines, thus the swollen lymphnodes. Advised her to try hydrocortisone cream or benadryl cream on the back of her neck. To call us back if lymphnode gets larger or if rash worsens. Give us an update next week. If rash worsens over weekend, develops fever/chills, be examined in UC.      Pt v/u

## 2023-11-09 DIAGNOSIS — E11.9 TYPE 2 DIABETES MELLITUS WITHOUT COMPLICATION, WITHOUT LONG-TERM CURRENT USE OF INSULIN (HCC): Chronic | ICD-10-CM

## 2023-11-09 DIAGNOSIS — I10 ESSENTIAL HYPERTENSION, BENIGN: Chronic | ICD-10-CM

## 2023-11-09 NOTE — TELEPHONE ENCOUNTER
Pt states she is in need of updated letter for employer. Please fax to ED  at fax # [phone]. Letter pending. METS 4- Able to climb up 2 flights of stairs, walk up hill and do house chores without symptoms./4-10 METS

## 2023-11-10 ENCOUNTER — TELEPHONE (OUTPATIENT)
Dept: INTERNAL MEDICINE CLINIC | Facility: CLINIC | Age: 63
End: 2023-11-10

## 2023-11-10 RX ORDER — METOPROLOL SUCCINATE 50 MG/1
50 TABLET, EXTENDED RELEASE ORAL DAILY
Qty: 90 TABLET | Refills: 0 | Status: SHIPPED | OUTPATIENT
Start: 2023-11-10

## 2023-11-10 NOTE — TELEPHONE ENCOUNTER
MORALES SAGE-    Pt called to inform VM that for her visit on 11/14 at 5, she may be 10-15 minutes late but it just depends     I informed pt of our steve period and she is aware     I told her she probably will have to r/s due to our steve period    Pt stated she cannot r/s visit    Heads up    Future Appointments   Date Time Provider Department Center   11/14/2023  5:00 PM Kenneth Batres MD [facility] 8 [facility] [place]

## 2024-01-15 ENCOUNTER — LAB ENCOUNTER (OUTPATIENT)
Dept: LAB | Age: 64
End: 2024-01-15
Attending: INTERNAL MEDICINE
Payer: COMMERCIAL

## 2024-01-15 ENCOUNTER — OFFICE VISIT (OUTPATIENT)
Dept: INTERNAL MEDICINE CLINIC | Facility: CLINIC | Age: 64
End: 2024-01-15
Payer: COMMERCIAL

## 2024-01-15 ENCOUNTER — TELEPHONE (OUTPATIENT)
Dept: INTERNAL MEDICINE CLINIC | Facility: CLINIC | Age: 64
End: 2024-01-15

## 2024-01-15 VITALS
TEMPERATURE: 97 F | BODY MASS INDEX: 30.22 KG/M2 | HEIGHT: 66 IN | SYSTOLIC BLOOD PRESSURE: 140 MMHG | OXYGEN SATURATION: 98 % | HEART RATE: 76 BPM | WEIGHT: 188 LBS | DIASTOLIC BLOOD PRESSURE: 90 MMHG | RESPIRATION RATE: 16 BRPM

## 2024-01-15 DIAGNOSIS — Z00.00 ROUTINE GENERAL MEDICAL EXAMINATION AT A HEALTH CARE FACILITY: Primary | ICD-10-CM

## 2024-01-15 DIAGNOSIS — E78.00 PURE HYPERCHOLESTEROLEMIA: Chronic | ICD-10-CM

## 2024-01-15 DIAGNOSIS — E08.9 DIABETES MELLITUS DUE TO UNDERLYING CONDITION, CONTROLLED, WITHOUT COMPLICATION, WITHOUT LONG-TERM CURRENT USE OF INSULIN (HCC): ICD-10-CM

## 2024-01-15 DIAGNOSIS — Z12.31 BREAST CANCER SCREENING BY MAMMOGRAM: ICD-10-CM

## 2024-01-15 DIAGNOSIS — I10 ESSENTIAL HYPERTENSION, BENIGN: Chronic | ICD-10-CM

## 2024-01-15 DIAGNOSIS — Z00.00 ROUTINE GENERAL MEDICAL EXAMINATION AT A HEALTH CARE FACILITY: ICD-10-CM

## 2024-01-15 PROBLEM — M23.92 INTERNAL DERANGEMENT OF LEFT KNEE: Chronic | Status: ACTIVE | Noted: 2023-02-27

## 2024-01-15 LAB
ALBUMIN SERPL-MCNC: 3.8 G/DL (ref 3.4–5)
ALBUMIN/GLOB SERPL: 0.9 {RATIO} (ref 1–2)
ALP LIVER SERPL-CCNC: 112 U/L
ALT SERPL-CCNC: 23 U/L
ANION GAP SERPL CALC-SCNC: 6 MMOL/L (ref 0–18)
AST SERPL-CCNC: 22 U/L (ref 15–37)
BASOPHILS # BLD AUTO: 0.04 X10(3) UL (ref 0–0.2)
BASOPHILS NFR BLD AUTO: 0.6 %
BILIRUB SERPL-MCNC: 0.6 MG/DL (ref 0.1–2)
BILIRUB UR QL STRIP.AUTO: NEGATIVE
BUN BLD-MCNC: 14 MG/DL (ref 9–23)
CALCIUM BLD-MCNC: 8.9 MG/DL (ref 8.5–10.1)
CHLORIDE SERPL-SCNC: 109 MMOL/L (ref 98–112)
CHOLEST SERPL-MCNC: 163 MG/DL (ref ?–200)
CLARITY UR REFRACT.AUTO: CLEAR
CO2 SERPL-SCNC: 24 MMOL/L (ref 21–32)
CREAT BLD-MCNC: 0.94 MG/DL
CREAT UR-SCNC: 89.9 MG/DL
EGFRCR SERPLBLD CKD-EPI 2021: 69 ML/MIN/1.73M2 (ref 60–?)
EOSINOPHIL # BLD AUTO: 0.24 X10(3) UL (ref 0–0.7)
EOSINOPHIL NFR BLD AUTO: 3.7 %
ERYTHROCYTE [DISTWIDTH] IN BLOOD BY AUTOMATED COUNT: 15 %
EST. AVERAGE GLUCOSE BLD GHB EST-MCNC: 148 MG/DL (ref 68–126)
FASTING PATIENT LIPID ANSWER: YES
FASTING STATUS PATIENT QL REPORTED: YES
GLOBULIN PLAS-MCNC: 4.3 G/DL (ref 2.8–4.4)
GLUCOSE BLD-MCNC: 110 MG/DL (ref 70–99)
GLUCOSE UR STRIP.AUTO-MCNC: NORMAL MG/DL
HBA1C MFR BLD: 6.8 % (ref ?–5.7)
HCT VFR BLD AUTO: 43 %
HDLC SERPL-MCNC: 61 MG/DL (ref 40–59)
HGB BLD-MCNC: 13.6 G/DL
IMM GRANULOCYTES # BLD AUTO: 0.01 X10(3) UL (ref 0–1)
IMM GRANULOCYTES NFR BLD: 0.2 %
KETONES UR STRIP.AUTO-MCNC: NEGATIVE MG/DL
LDLC SERPL CALC-MCNC: 88 MG/DL (ref ?–100)
LEUKOCYTE ESTERASE UR QL STRIP.AUTO: NEGATIVE
LYMPHOCYTES # BLD AUTO: 2.9 X10(3) UL (ref 1–4)
LYMPHOCYTES NFR BLD AUTO: 45 %
MCH RBC QN AUTO: 29.5 PG (ref 26–34)
MCHC RBC AUTO-ENTMCNC: 31.6 G/DL (ref 31–37)
MCV RBC AUTO: 93.3 FL
MICROALBUMIN UR-MCNC: 1.24 MG/DL
MICROALBUMIN/CREAT 24H UR-RTO: 13.8 UG/MG (ref ?–30)
MONOCYTES # BLD AUTO: 0.7 X10(3) UL (ref 0.1–1)
MONOCYTES NFR BLD AUTO: 10.9 %
NEUTROPHILS # BLD AUTO: 2.55 X10 (3) UL (ref 1.5–7.7)
NEUTROPHILS # BLD AUTO: 2.55 X10(3) UL (ref 1.5–7.7)
NEUTROPHILS NFR BLD AUTO: 39.6 %
NITRITE UR QL STRIP.AUTO: NEGATIVE
NONHDLC SERPL-MCNC: 102 MG/DL (ref ?–130)
OSMOLALITY SERPL CALC.SUM OF ELEC: 289 MOSM/KG (ref 275–295)
PH UR STRIP.AUTO: 7 [PH] (ref 5–8)
PLATELET # BLD AUTO: 262 10(3)UL (ref 150–450)
POTASSIUM SERPL-SCNC: 4 MMOL/L (ref 3.5–5.1)
PROT SERPL-MCNC: 8.1 G/DL (ref 6.4–8.2)
PROT UR STRIP.AUTO-MCNC: NEGATIVE MG/DL
RBC # BLD AUTO: 4.61 X10(6)UL
RBC UR QL AUTO: NEGATIVE
SODIUM SERPL-SCNC: 139 MMOL/L (ref 136–145)
SP GR UR STRIP.AUTO: 1.01 (ref 1–1.03)
T4 SERPL-MCNC: 7.7 UG/DL
TRIGL SERPL-MCNC: 75 MG/DL (ref 30–149)
TSI SER-ACNC: 1.35 MIU/ML (ref 0.36–3.74)
UROBILINOGEN UR STRIP.AUTO-MCNC: NORMAL MG/DL
VLDLC SERPL CALC-MCNC: 12 MG/DL (ref 0–30)
WBC # BLD AUTO: 6.4 X10(3) UL (ref 4–11)

## 2024-01-15 PROCEDURE — 80061 LIPID PANEL: CPT

## 2024-01-15 PROCEDURE — 84443 ASSAY THYROID STIM HORMONE: CPT

## 2024-01-15 PROCEDURE — 83036 HEMOGLOBIN GLYCOSYLATED A1C: CPT

## 2024-01-15 PROCEDURE — 81003 URINALYSIS AUTO W/O SCOPE: CPT

## 2024-01-15 PROCEDURE — 85025 COMPLETE CBC W/AUTO DIFF WBC: CPT

## 2024-01-15 PROCEDURE — 80053 COMPREHEN METABOLIC PANEL: CPT

## 2024-01-15 PROCEDURE — 82043 UR ALBUMIN QUANTITATIVE: CPT

## 2024-01-15 PROCEDURE — 82570 ASSAY OF URINE CREATININE: CPT

## 2024-01-15 PROCEDURE — 84436 ASSAY OF TOTAL THYROXINE: CPT

## 2024-01-15 NOTE — PROGRESS NOTES
Lianet Fan  1960 is a 63 year old female.    Chief Complaint   Patient presents with    Physical       HPI:   Feels ok no cc  Current Outpatient Medications   Medication Sig Dispense Refill    METOPROLOL SUCCINATE ER 50 MG Oral Tablet 24 Hr TAKE 1 TABLET(50 MG) BY MOUTH DAILY 90 tablet 0    METFORMIN 500 MG Oral Tab TAKE 1 TABLET(500 MG) BY MOUTH TWICE DAILY 180 tablet 0    AMLODIPINE 10 MG Oral Tab TAKE 1 TABLET(10 MG) BY MOUTH DAILY 90 tablet 1    losartan 50 MG Oral Tab TAKE 2 TABLETS(100 MG) BY MOUTH DAILY 180 tablet 1    fluticasone furoate-vilanterol (BREO ELLIPTA) 200-25 MCG/ACT Inhalation Aerosol Powder, Breath Activated Inhale 1 puff into the lungs daily. 1 each 0    diclofenac 50 MG Oral Tab EC Take 1 tablet (50 mg total) by mouth 2 (two) times daily. (Patient taking differently: Take 1 tablet (50 mg total) by mouth as needed.) 60 tablet 0    atorvastatin 20 MG Oral Tab Take 1 tablet (20 mg total) by mouth nightly. 90 tablet 1    ASPIRIN LOW DOSE 81 MG Oral Tab EC TAKE 1 TABLET BY MOUTH DAILY 30 tablet 0      Past Medical History:   Diagnosis Date    Baker's cyst of knee 2015    bilateral    Mayes's cyst of knee 2015    bilateral     Pure hypercholesterolemia     Unspecified essential hypertension     benign      Social History:  Social History     Socioeconomic History    Marital status: Single   Tobacco Use    Smoking status: Never    Smokeless tobacco: Never   Vaping Use    Vaping Use: Never used   Substance and Sexual Activity    Alcohol use: Yes     Alcohol/week: 0.0 standard drinks of alcohol     Comment: Socially     Drug use: No        REVIEW OF SYSTEMS:     General/Constitutional:   General able to do usual activities, good exercise tolerance, good general state of health, no fatigue, no fever, no weakness, no weight loss or gain .   HEENT/Neck:   Head no headache, no dizziness, no lightheadedness. Eyes normal vision, no redness, no drainage. Ears no earaches, no fullness,  normal hearing, no tinnitus. Nose and Sinuses no recurrent colds, , no itching, no hay fever, no nosebleeds. Mouth and Pharynx no sore throats, no hoarseness. Neck no lumps, no goiter, no neck stiffness or pain.   Stuffy nose with sneezing   Endocrine:   Diabetes prediabetic . Thyroid disorder none.   Respiratory:   Patient denies chest pain, , WILL (dyspnea on exertion), chest congestion, blood-tinged sputum/wheezing. Breathing normal pattern .   Cardiovascular:   Patient denies chest pain, shortness of breath/rheumatic fever/murmur/dizzziness cholesterol normal. Leg edema none. Orthopnea none. Palpitations none. PND (paroxsymal nocturnal dyspnea) none. Exercise none   Gastrointestinal:   Patient denies abdominal pain, blood in stool, constipation, diarrhea, difficulty swallowing, change in stools, heartburn, nausea, vomiting   recent increase in weigh noted no heart burn noted.   Hematology:   Patient denies abnormal bleeding, easy bleeding, easy bruising. Enlarged lymph nodes none.   Genitourinary:   Patient denies difficulty urinating, frequent urination, hematuria. Dysuria none. Nocturia None. no Urinary frequency. no Urinary incontinence.   Musculoskeletal:   Arthritis None. Back pain stable . Joint pain occ left knee pain Joint stiffness no. Muscle weakness none.   Peripheral Vascular:   General no varicosities, no claudication.   Dermatologic:   Rash none.   Neurologic:   Patient denies dizziness, fainting, loss of consciousness, weakness. Memory loss none. Tingling/numbness none. Trouble with balance none.   Psychiatric:   Patient denies depression, hallucinations, memory loss. no Anxiety, none. Insomnia none.              EXAM:   /90 (BP Location: Right arm, Patient Position: Sitting, Cuff Size: adult)   Pulse 76   Temp 97.2 °F (36.2 °C) (Temporal)   Resp 16   Ht 5' 6\" (1.676 m)   Wt 188 lb (85.3 kg)   LMP 06/12/2015   SpO2 98%   BMI 30.34 kg/m²   GENERAL:   Build: normal .   General  Appearance: alert and oriented, pleasant.   HEENT:   Ear canals: normal.   EOM: within normal limit-conjunctiva normal.     Head: normocephalic.   Nasal septum: midline.   Nose: unremarkable.   Oral cavity: normal.   Pupils: normal, bilaterally .   Sclera: normal.   Turbinates: normal.   NECK:   Carotid bruit: none.   Cervical lymph nodes: unremarkable.   JVD: none.   Range of Motion: normal.   Thyroid: unremarkable.   HEART:   Clicks: absent .   Edema: none visible .   Heart sounds: normal.   Murmurs: none.   Rhythm: regular.   LUNGS:   Auscultation: clear .   Chest Shape: normal .   Percussion: normal.   Rales: no .   Respiratory effort: normal .     Infrequent wheeze  ABDOMEN:   General: normal.   Hernia: absent.   Inguinal nodes: none.   Liver, Spleen: non-enlarged.   Rebound tenderness: absent.   Tenderness: absent .   EXTREMITIES:   Clubbing: none.   Cyanosis: absent .   Edema: none.   Pulses: present, bilateral.   Tremors: no.   Varicose veins: not present.   MUSCULOSKELETAL:   Cervical spines: normal.   L-S spines: normal.   Lower extremity joints: normal.   Upper extremity joints: normal.   NEUROLOGICAL:   Babinski: negative/all reflexes are normal.   Cerebellar Testing grossly/intact: yes.   Gait: normal.   Motor: power-normal/tone -normal/co-ordination normal/wasting -none/involuntary movements -none.   Sensory: normal sensation to all modalities.   LYMPHATICS:   Groin: no adenopathy .   Inguinal: no adenopathy.   Supraclavicular: none.   DERMATOLOGY:   Rash: no.     To see gyn         ASSESSMENT AND PLAN:   Lianet was seen today for physical.    Diagnoses and all orders for this visit:    Routine general medical examination at a health care facility  -     CBC With Differential With Platelet; Future  -     Comp Metabolic Panel (14); Future  -     T4 (Thyroxine Total); Future  -     Assay, Thyroid Stim Hormone; Future  -     Urinalysis, Routine; Future  -     EKG with interpretation and Report -IN OFFICE  [[id number]]  -     Gastro Referral - In Network  -     XR DEXA BONE DENSITOMETRY (CPT=77080); Future  -     [facility] RYAN 2D+3D SCREENING BILAT (CPT=77067/38656); Future    Breast cancer screening by mammogram  -     [facility] RYAN 2D+3D SCREENING BILAT (CPT=77067/36256); Future    Essential hypertension, benign    Pure hypercholesterolemia  -     Lipid Panel; Future    Diabetes mellitus due to underlying condition, controlled, without complication, without long-term current use of insulin (HCC)  -     Hemoglobin A1C; Future  -     Microalb/Creat Ratio, Random Urine; Future  -     Refer to Opthalmology    Other orders  -     Zoster Recombinant Adjuvanted (Shingrix -Shingles) [[id number]]  -     Zoster Recombinant Adjuvanted (Shingrix -Shingles) [[id number]]  -     Prevnar 20 (PCV20) [[id number]]      Follow-up pending of all labs  Patient Instructions    See me after all tests.  Weight loss recommended.   The patient indicates understanding of these issues and agrees to the plan.  The patient is asked to Return in about 4 weeks (around 2/12/2024) for result discussion.  .      Kenneth Batres MD

## 2024-01-16 ENCOUNTER — TELEPHONE (OUTPATIENT)
Dept: INTERNAL MEDICINE CLINIC | Facility: CLINIC | Age: 64
End: 2024-01-16

## 2024-01-16 ENCOUNTER — VIRTUAL PHONE E/M (OUTPATIENT)
Dept: INTERNAL MEDICINE CLINIC | Facility: CLINIC | Age: 64
End: 2024-01-16
Payer: COMMERCIAL

## 2024-01-16 DIAGNOSIS — R94.31 ABNORMAL EKG: ICD-10-CM

## 2024-01-16 DIAGNOSIS — E08.9 DIABETES MELLITUS DUE TO UNDERLYING CONDITION, CONTROLLED, WITHOUT COMPLICATION, WITHOUT LONG-TERM CURRENT USE OF INSULIN (HCC): Primary | ICD-10-CM

## 2024-01-16 LAB
ATRIAL RATE: 64 BPM
P AXIS: 46 DEGREES
P-R INTERVAL: 222 MS
Q-T INTERVAL: 428 MS
QRS DURATION: 104 MS
QTC CALCULATION (BEZET): 441 MS
R AXIS: -37 DEGREES
T AXIS: 40 DEGREES
VENTRICULAR RATE: 64 BPM

## 2024-01-16 PROCEDURE — 99213 OFFICE O/P EST LOW 20 MIN: CPT | Performed by: INTERNAL MEDICINE

## 2024-01-16 NOTE — PROGRESS NOTES
Virtual Telephone Check-In    Lianet Fan verbally consents to a Virtual/Telephone Check-In visit on 01/16/24.  Patient has been referred to the [facility] website at [email or web address] to review the yearly Consent to Treat document.    Patient understands and accepts financial responsibility for any deductible, co-insurance and/or co-pays associated with this service.    Duration of the service: 13 minutes      Summary of topics discussed: Josi with patient EKG in view of her risk factors patient is agreeable to proceed with further testing.  As far as the diabetes goes the patient does state that she has been noncompliant with the diet exercise and had been off medicines for a while.  Patient requesting reinforcement of diet exercise resuming her medicines and then repeating a hemoglobin A1c in 4 months      Diagnoses and all orders for this visit:    Diabetes mellitus due to underlying condition, controlled, without complication, without long-term current use of insulin (HCC)  -     CARD ECHO STRESS ECHO/REST AND STRESS(CPT=93350/05238 DMG 40189); Future  -     Hemoglobin A1C; Future    Abnormal EKG  -     CARD ECHO STRESS ECHO/REST AND STRESS(CPT=93350/88788 DMG 13197); Future          Kenneth Batres MD

## 2024-01-16 NOTE — TELEPHONE ENCOUNTER
Patient see me for an elevated hemoglobin A1c also wanted to talk to her about the EKG.  She can do this either in the form of a telephone visit or an in office visit I prefer an in office visit on Thursday or today if possible

## 2024-01-16 NOTE — TELEPHONE ENCOUNTER
Spoke to pt who said today at 2:15 PM will work. Scheduled pt in that open appt spot:    Future Appointments   Date Time Provider Department Center   1/16/2024  2:15 PM Kenneth Batres MD [facility] [place]   1/20/2024  9:40 AM WDR DEXA RM1 WDR DEXA EDW [facility]   2/10/2024 10:00 AM BBK SARAH RM1 BBK MAMMO [place]   2/12/2024  5:00 PM Kenneth Batres MD [facility] 8 [facility] [place]   3/29/2024  9:20 AM Joanna Phillips MD [facility]   4/15/2024  3:45 PM EMG 08 NURSE EM[facility] 8 [facility] [place]

## 2024-01-24 DIAGNOSIS — I10 ESSENTIAL HYPERTENSION, BENIGN: Chronic | ICD-10-CM

## 2024-01-24 RX ORDER — METOPROLOL SUCCINATE 50 MG/1
50 TABLET, EXTENDED RELEASE ORAL DAILY
Qty: 90 TABLET | Refills: 0 | Status: SHIPPED | OUTPATIENT
Start: 2024-01-24

## 2024-01-24 NOTE — TELEPHONE ENCOUNTER
Name from pharmacy: METOPROLOL ER SUCCINATE 50MG TABS          Will file in chart as: METOPROLOL SUCCINATE ER 50 MG Oral Tablet 24 Hr    Sig: TAKE 1 TABLET(50 MG) BY MOUTH DAILY    Disp: 90 tablet    Refills: 0 (Pharmacy requested: Not specified)    Start: 1/24/2024    Class: Normal    Non-formulary For: Essential hypertension, benign    Last ordered: 2 months ago (11/10/2023) by Kenneth Batres MD    Last refill: 11/10/2023    Rx #: [id number]    Hypertension Medications Protocol [id number]/24/2024 05:52 AM   Protocol Details CMP or BMP in past 12 months    Last serum creatinine< 2.0    Appointment in past 6 or next 3 months      [id number] [place], [place] - [address] AT [facility] [address], [phone], [phone]

## 2024-01-29 ENCOUNTER — TELEPHONE (OUTPATIENT)
Dept: INTERNAL MEDICINE CLINIC | Facility: CLINIC | Age: 64
End: 2024-01-29

## 2024-02-05 DIAGNOSIS — I10 ESSENTIAL HYPERTENSION, BENIGN: Chronic | ICD-10-CM

## 2024-02-06 RX ORDER — AMLODIPINE BESYLATE 10 MG/1
10 TABLET ORAL DAILY
Qty: 90 TABLET | Refills: 1 | Status: SHIPPED | OUTPATIENT
Start: 2024-02-06

## 2024-02-06 NOTE — TELEPHONE ENCOUNTER
Protocol passed   Amlodipine 10mg     LOV: 1/15/24   RTC: 4 weeks  Filled: 7/24/23 # 90 1 refill   Labs: 1/1/24   Future Appointments   Date Time Provider Department Center   2/10/2024 10:00 AM LEBRON SMITH RM1 LEBR[facility] [place]   3/29/2024  9:20 AM Joanna Phillips MD [facility]   4/15/2024  3:45 PM EMG 08 NURSE [facility] 8 [facility] [place]   5/16/2024  5:00 PM Kenneth Batres MD [facility] 8 [facility] [place]

## 2024-02-10 ENCOUNTER — HOSPITAL ENCOUNTER (OUTPATIENT)
Dept: MAMMOGRAPHY | Age: 64
Discharge: HOME | End: 2024-02-10
Attending: INTERNAL MEDICINE
Payer: COMMERCIAL

## 2024-02-10 DIAGNOSIS — Z00.00 ROUTINE GENERAL MEDICAL EXAMINATION AT A HEALTH CARE FACILITY: ICD-10-CM

## 2024-02-10 PROCEDURE — 77063 BREAST TOMOSYNTHESIS BI: CPT | Performed by: INTERNAL MEDICINE

## 2024-02-10 PROCEDURE — 77067 SCR MAMMO BI INCL CAD: CPT | Performed by: INTERNAL MEDICINE

## 2024-03-01 DIAGNOSIS — E11.9 TYPE 2 DIABETES MELLITUS WITHOUT COMPLICATION, WITHOUT LONG-TERM CURRENT USE OF INSULIN (HCC): Chronic | ICD-10-CM

## 2024-03-04 NOTE — TELEPHONE ENCOUNTER
Protocol passed     Metformin 500mg     LOV: 1/15/24  RTC: 4 weeks   Labs: 1/15/24  Filled: 11/10/23 # 180 0 refill   Future Appointments   Date Time Provider Department Center   3/29/2024  9:20 AM Joanna Phillips MD [facility]   4/15/2024  3:45 PM EMG 08 NURSE [facility] 8 [facility] [place]   5/16/2024  5:00 PM Kenneth Batres MD [facility] 8 [facility] [place]

## 2024-03-25 DIAGNOSIS — E78.00 PURE HYPERCHOLESTEROLEMIA: Chronic | ICD-10-CM

## 2024-03-26 RX ORDER — ATORVASTATIN CALCIUM 20 MG/1
20 TABLET, FILM COATED ORAL NIGHTLY
Qty: 90 TABLET | Refills: 0 | Status: SHIPPED | OUTPATIENT
Start: 2024-03-26

## 2024-03-26 RX ORDER — LOSARTAN POTASSIUM 50 MG/1
100 TABLET ORAL DAILY
Qty: 180 TABLET | Refills: 0 | Status: SHIPPED | OUTPATIENT
Start: 2024-03-26

## 2024-03-26 NOTE — TELEPHONE ENCOUNTER
Protocol passed   Atorvastatin 20mg filled 1/6/23 # 90 1     Protocol failed   Losartan 50mg filled 7/24/23 # 180 1 refill     LOV: 1/15/24  RTC: 4 weeks  Labs: 1/15/24   Future Appointments   Date Time Provider Department Center   3/29/2024  9:20 AM Joanna Phillips MD [facility]   4/15/2024  3:45 PM EMG 08 NURSE [facility] 8 [facility] [place]   5/16/2024  5:00 PM Kenneth Batres MD [facility] 8 [facility] [place]

## 2024-03-29 ENCOUNTER — OFFICE VISIT (OUTPATIENT)
Dept: OBGYN CLINIC | Facility: CLINIC | Age: 64
End: 2024-03-29
Payer: COMMERCIAL

## 2024-03-29 VITALS
BODY MASS INDEX: 30 KG/M2 | HEART RATE: 76 BPM | SYSTOLIC BLOOD PRESSURE: 133 MMHG | WEIGHT: 186.38 LBS | DIASTOLIC BLOOD PRESSURE: 74 MMHG

## 2024-03-29 DIAGNOSIS — N64.4 BREAST PAIN: ICD-10-CM

## 2024-03-29 DIAGNOSIS — Z12.4 SCREENING FOR CERVICAL CANCER: ICD-10-CM

## 2024-03-29 DIAGNOSIS — Z86.018 HISTORY OF UTERINE FIBROID: ICD-10-CM

## 2024-03-29 DIAGNOSIS — N85.2 ENLARGED UTERUS: ICD-10-CM

## 2024-03-29 DIAGNOSIS — Z01.419 ENCOUNTER FOR GYNECOLOGICAL EXAMINATION WITHOUT ABNORMAL FINDING: Primary | ICD-10-CM

## 2024-03-29 PROCEDURE — 3078F DIAST BP <80 MM HG: CPT | Performed by: OBSTETRICS & GYNECOLOGY

## 2024-03-29 PROCEDURE — 99386 PREV VISIT NEW AGE 40-64: CPT | Performed by: OBSTETRICS & GYNECOLOGY

## 2024-03-29 PROCEDURE — 99202 OFFICE O/P NEW SF 15 MIN: CPT | Performed by: OBSTETRICS & GYNECOLOGY

## 2024-03-29 PROCEDURE — 3075F SYST BP GE 130 - 139MM HG: CPT | Performed by: OBSTETRICS & GYNECOLOGY

## 2024-03-29 NOTE — PROGRESS NOTES
Lianet Fan is a 63 year old female  Patient's last menstrual period was 2015. who presents for   Chief Complaint   Patient presents with    Gyn Exam     New pt annual // Left breast- occ sharp pain per pt      C/o occas sharp breast pain- she does not drink caffeine but does eat salty foods- chips- we discussed that this can cause water retention and tissue swelling- she will keep a pain diary and do breast self exams.  Her mammogram last week was normal.      OBSTETRICS HISTORY:  OB History    Para Term  AB Living   2 1 1 0 1 1   SAB IAB Ectopic Multiple Live Births   1 0 0 0 1       GYNE HISTORY:        MEDICAL HISTORY:  Past Medical History:   Diagnosis Date    Baker's cyst of knee 2015    bilateral    Mayes's cyst of knee 2015    bilateral     Pure hypercholesterolemia     Unspecified essential hypertension     benign       SURGICAL HISTORY:  History reviewed. No pertinent surgical history.    SOCIAL HISTORY:  Social History     Socioeconomic History    Marital status: Single   Tobacco Use    Smoking status: Never    Smokeless tobacco: Never   Vaping Use    Vaping Use: Never used   Substance and Sexual Activity    Alcohol use: Yes     Alcohol/week: 0.0 standard drinks of alcohol     Comment: Socially     Drug use: No    Sexual activity: Yes         FAMILY HISTORY:  Family History   Problem Relation Age of Onset    Hypertension Father     Hypertension Mother     Heart Attack Mother        MEDICATIONS:    Current Outpatient Medications:     atorvastatin 20 MG Oral Tab, Take 1 tablet (20 mg total) by mouth nightly., Disp: 90 tablet, Rfl: 0    losartan 50 MG Oral Tab, Take 2 tablets (100 mg total) by mouth daily., Disp: 180 tablet, Rfl: 0    METFORMIN 500 MG Oral Tab, TAKE 1 TABLET(500 MG) BY MOUTH TWICE DAILY, Disp: 180 tablet, Rfl: 0    AMLODIPINE 10 MG Oral Tab, TAKE 1 TABLET(10 MG) BY MOUTH DAILY, Disp: 90 tablet, Rfl: 1    metoprolol succinate ER 50 MG Oral Tablet 24 Hr,  Take 1 tablet (50 mg total) by mouth daily., Disp: 90 tablet, Rfl: 0    fluticasone furoate-vilanterol (BREO ELLIPTA) 200-25 MCG/ACT Inhalation Aerosol Powder, Breath Activated, Inhale 1 puff into the lungs daily., Disp: 1 each, Rfl: 0    diclofenac 50 MG Oral Tab EC, Take 1 tablet (50 mg total) by mouth 2 (two) times daily. (Patient taking differently: Take 1 tablet (50 mg total) by mouth as needed.), Disp: 60 tablet, Rfl: 0    ASPIRIN LOW DOSE 81 MG Oral Tab EC, TAKE 1 TABLET BY MOUTH DAILY, Disp: 30 tablet, Rfl: 0    ALLERGIES:    Allergies   Allergen Reactions    Ace Inhibitors          Review of Systems:  Constitutional:  Denies fatigue, night sweats, hot flashes  Eyes:  denies blurred or double vision  Cardiovascular:  denies chest pain or palpitations  Respiratory:  denies shortness of breath  Gastrointestinal:  denies heartburn, abdominal pain, diarrhea or constipation  Genitourinary:  denies dysuria, incontinence, abnormal vaginal discharge, vaginal itching  Musculoskeletal:  denies back pain.  Skin/Breast:  Denies any breast pain, lumps, or discharge.   Neurological:  denies headaches, extremity weakness or numbness.  Psychiatric: denies depression or anxiety.  Endocrine:   denies excessive thirst or urination.  Heme/Lymph:  denies history of anemia, easy bruising or bleeding.    Depression Screening (PHQ-2/PHQ-9): Over the LAST 2 WEEKS   Little interest or pleasure in doing things (over the last two weeks)?: Not at all    Feeling down, depressed, or hopeless (over the last two weeks)?: Not at all    PHQ-2 SCORE: 0         Blood pressure 133/74, pulse 76, weight 186 lb 6.4 oz (84.6 kg), last menstrual period 06/12/2015, not currently breastfeeding.    PHYSICAL EXAM:   Constitutional: well developed, well nourished  Head/Face: normocephalic  Neck/Thyroid: thyroid symmetric, no thyromegaly, no nodules, no adenopathy  Lymphatic:no abnormal supraclavicular or axillary adenopathy is noted  Breast: normal  without palpable masses, tenderness, asymmetry, nipple discharge, nipple retraction or skin changes  Respiratory:  lungs clear to auscultation bilaterally  Cardiovascular: regular rate and rhythm, no significant murmur  Abdomen:  soft, nontender, nondistended, no masses  Skin/Hair: no unusual rashes or bruises  Extremities: no edema, no cyanosis  Psychiatric:  Oriented to time, place, person and situation. Appropriate mood and affect    Pelvic Exam:  External Genitalia: normal appearance, hair distribution, and no lesions  Urethral Meatus:  normal in size, location, without lesions and prolapse  Bladder:  No fullness, masses or tenderness  Vagina:  Normal appearance without lesions, no abnormal discharge  Cervix:  Normal without tenderness on motion  Uterus: uterus 12+ weeks size-bulky- pt states has h/o fibroids but they are not symptomatic, without tenderness  Adnexa: normal without masses or tenderness  Perineum: normal  Rectovaginal: no masses, normal tone  Anus: no thrombosed or ulcerated hemorroids    Assessment & Plan:   ASCCP guidelines discussed,cotest done,mammogram ordered,rtc 1 year for annual exam   SBE encouraged  Will notify of US results to get baseline of fibroids- will continue with expectant management if pt remains asymptomatic.     Encounter Diagnoses   Name Primary?    Encounter for gynecological examination without abnormal finding Yes    Breast pain     Enlarged uterus     History of uterine fibroid      No orders of the defined types were placed in this encounter.    Requested Prescriptions      No prescriptions requested or ordered in this encounter     US PELVIS W EV (CPT=76856/41043)

## 2024-04-01 LAB — HPV I/H RISK 1 DNA SPEC QL NAA+PROBE: NEGATIVE

## 2024-04-05 ENCOUNTER — TELEPHONE (OUTPATIENT)
Dept: INTERNAL MEDICINE CLINIC | Facility: CLINIC | Age: 64
End: 2024-04-05

## 2024-04-05 NOTE — TELEPHONE ENCOUNTER
Patient called to inform she received EOB from insurance for Physical appointment on 1/15/2024.   EOB is showing dos was 1/16/24 when physical was done on 1/15/24.   Coding is also showing this appointment was a standard office visit and not physical.   Please make appropriate changes to show appointment was physical and done on 1/15/24 not 1/16/24.

## 2024-04-05 NOTE — TELEPHONE ENCOUNTER
Called and Spoke with the  patient and informed  her-that I sent a request to Dr. Batres, and the Billing department to make the change, correct the errors and resubmit.

## 2024-05-07 DIAGNOSIS — I10 ESSENTIAL HYPERTENSION, BENIGN: Chronic | ICD-10-CM

## 2024-05-07 RX ORDER — METOPROLOL SUCCINATE 50 MG/1
50 TABLET, EXTENDED RELEASE ORAL DAILY
Qty: 90 TABLET | Refills: 0 | Status: SHIPPED | OUTPATIENT
Start: 2024-05-07

## 2024-05-07 NOTE — TELEPHONE ENCOUNTER
LOV: 1/16/2024 with Dr. Batres  RTC: 4 weeks  Last Relevant Labs: 1/15/2024  Filled: 1/24/2024    #90 with 0 refills    Future Appointments   Date Time Provider Department Center   5/16/2024  5:00 PM Kenneth Batres MD [facility] [place]

## 2024-05-16 ENCOUNTER — OFFICE VISIT (OUTPATIENT)
Dept: INTERNAL MEDICINE CLINIC | Facility: CLINIC | Age: 64
End: 2024-05-16

## 2024-05-16 VITALS
RESPIRATION RATE: 16 BRPM | DIASTOLIC BLOOD PRESSURE: 74 MMHG | TEMPERATURE: 98 F | BODY MASS INDEX: 29.09 KG/M2 | SYSTOLIC BLOOD PRESSURE: 130 MMHG | HEIGHT: 66 IN | OXYGEN SATURATION: 96 % | WEIGHT: 181 LBS | HEART RATE: 74 BPM

## 2024-05-16 DIAGNOSIS — E08.9 DIABETES MELLITUS DUE TO UNDERLYING CONDITION, CONTROLLED, WITHOUT COMPLICATION, WITHOUT LONG-TERM CURRENT USE OF INSULIN (HCC): Primary | ICD-10-CM

## 2024-05-16 DIAGNOSIS — E11.9 TYPE 2 DIABETES MELLITUS WITHOUT COMPLICATION, WITHOUT LONG-TERM CURRENT USE OF INSULIN (HCC): Chronic | ICD-10-CM

## 2024-05-16 DIAGNOSIS — I10 ESSENTIAL HYPERTENSION, BENIGN: Chronic | ICD-10-CM

## 2024-05-16 DIAGNOSIS — E78.00 PURE HYPERCHOLESTEROLEMIA: Chronic | ICD-10-CM

## 2024-05-16 PROCEDURE — 99213 OFFICE O/P EST LOW 20 MIN: CPT | Performed by: INTERNAL MEDICINE

## 2024-05-16 PROCEDURE — 3008F BODY MASS INDEX DOCD: CPT | Performed by: INTERNAL MEDICINE

## 2024-05-16 PROCEDURE — 3075F SYST BP GE 130 - 139MM HG: CPT | Performed by: INTERNAL MEDICINE

## 2024-05-16 PROCEDURE — 90471 IMMUNIZATION ADMIN: CPT | Performed by: INTERNAL MEDICINE

## 2024-05-16 PROCEDURE — 90750 HZV VACC RECOMBINANT IM: CPT | Performed by: INTERNAL MEDICINE

## 2024-05-16 PROCEDURE — 3078F DIAST BP <80 MM HG: CPT | Performed by: INTERNAL MEDICINE

## 2024-05-16 RX ORDER — LOSARTAN POTASSIUM 50 MG/1
100 TABLET ORAL DAILY
Qty: 180 TABLET | Refills: 0 | Status: SHIPPED | OUTPATIENT
Start: 2024-05-16

## 2024-05-16 RX ORDER — AMLODIPINE BESYLATE 10 MG/1
10 TABLET ORAL DAILY
Qty: 90 TABLET | Refills: 1 | Status: SHIPPED | OUTPATIENT
Start: 2024-05-16

## 2024-05-16 RX ORDER — ATORVASTATIN CALCIUM 20 MG/1
20 TABLET, FILM COATED ORAL NIGHTLY
Qty: 90 TABLET | Refills: 0 | Status: SHIPPED | OUTPATIENT
Start: 2024-05-16

## 2024-05-16 RX ORDER — METOPROLOL SUCCINATE 50 MG/1
50 TABLET, EXTENDED RELEASE ORAL DAILY
Qty: 90 TABLET | Refills: 0 | Status: SHIPPED | OUTPATIENT
Start: 2024-05-16

## 2024-05-16 NOTE — PATIENT INSTRUCTIONS
Patient will think about either increasing the metformin or going for GLP-1 agonist if she desires to lose weight and bring the hemoglobin A1c down.  Will await the fresh blood work.  Blood from January was discussed.  Patient yet to go for DEXA scan.

## 2024-05-16 NOTE — PROGRESS NOTES
Lianet Fan  1960 is a 63 year old female.    Chief Complaint   Patient presents with    Follow - Up     4 month       HPI:   Here for lab work discussion.  Also here to get refill of medicines.  Current Outpatient Medications   Medication Sig Dispense Refill    diclofenac 50 MG Oral Tab EC Take 1 tablet (50 mg total) by mouth as needed. 30 tablet 3    amLODIPine 10 MG Oral Tab Take 1 tablet (10 mg total) by mouth daily. 90 tablet 1    atorvastatin 20 MG Oral Tab Take 1 tablet (20 mg total) by mouth nightly. 90 tablet 0    losartan 50 MG Oral Tab Take 2 tablets (100 mg total) by mouth daily. 180 tablet 0    metFORMIN 500 MG Oral Tab Take 1 tablet (500 mg total) by mouth 2 (two) times daily. 180 tablet 0    metoprolol succinate ER 50 MG Oral Tablet 24 Hr Take 1 tablet (50 mg total) by mouth daily. 90 tablet 0    fluticasone furoate-vilanterol (BREO ELLIPTA) 200-25 MCG/ACT Inhalation Aerosol Powder, Breath Activated Inhale 1 puff into the lungs daily. 1 each 0    ASPIRIN LOW DOSE 81 MG Oral Tab EC TAKE 1 TABLET BY MOUTH DAILY 30 tablet 0      Past Medical History:    Baker's cyst of knee    bilateral    Baker's cyst of knee    bilateral     Pure hypercholesterolemia    Unspecified essential hypertension    benign      Social History:  Social History     Socioeconomic History    Marital status: Single   Tobacco Use    Smoking status: Never    Smokeless tobacco: Never   Vaping Use    Vaping status: Never Used   Substance and Sexual Activity    Alcohol use: Yes     Alcohol/week: 0.0 standard drinks of alcohol     Comment: Socially     Drug use: No    Sexual activity: Yes     Social Determinants of Health      [facility]    Social Connections    [facility]    Housing Stability        REVIEW OF SYSTEMS:   na        EXAM:   /74 (BP Location: Right arm, Patient Position:  Sitting, Cuff Size: adult)   Pulse 74   Temp 98.1 °F (36.7 °C) (Temporal)   Resp 16   Ht 5' 6\" (1.676 m)   Wt 181 lb (82.1 kg)   LMP 06/12/2015   SpO2 96%   BMI 29.21 kg/m²     na      ASSESSMENT AND PLAN:   Lianet was seen today for follow - up.    Diagnoses and all orders for this visit:    Diabetes mellitus due to underlying condition, controlled, without complication, without long-term current use of insulin (HCC)  -     Hemoglobin A1C; Future    Essential hypertension, benign  -     amLODIPine 10 MG Oral Tab; Take 1 tablet (10 mg total) by mouth daily.  -     metoprolol succinate ER 50 MG Oral Tablet 24 Hr; Take 1 tablet (50 mg total) by mouth daily.    Pure hypercholesterolemia  -     atorvastatin 20 MG Oral Tab; Take 1 tablet (20 mg total) by mouth nightly.    Type 2 diabetes mellitus without complication, without long-term current use of insulin (HCC)  -     metFORMIN 500 MG Oral Tab; Take 1 tablet (500 mg total) by mouth 2 (two) times daily.    Other orders  -     diclofenac 50 MG Oral Tab EC; Take 1 tablet (50 mg total) by mouth as needed.  -     losartan 50 MG Oral Tab; Take 2 tablets (100 mg total) by mouth daily.    Weight loss counseling done.    Patient Instructions   Patient will think about either increasing the metformin or going for GLP-1 agonist if she desires to lose weight and bring the hemoglobin A1c down.  Will await the fresh blood work.  Blood from January was discussed.  Patient yet to go for DEXA scan.   The patient indicates understanding of these issues and agrees to the plan.  The patient is asked to No follow-ups on file.  .      Kenneth Batres MD

## 2024-05-31 ENCOUNTER — TELEPHONE (OUTPATIENT)
Dept: INTERNAL MEDICINE CLINIC | Facility: CLINIC | Age: 64
End: 2024-05-31

## 2024-05-31 DIAGNOSIS — Z12.11 ENCOUNTER FOR SCREENING COLONOSCOPY: Primary | ICD-10-CM

## 2024-05-31 NOTE — TELEPHONE ENCOUNTER
S/w patient and informed. Order placed for Dr Mat GARCIA for screening colonoscopy. Order faxed per patient request to [phone]

## 2024-05-31 NOTE — TELEPHONE ENCOUNTER
Called patient to clarify why referral was needed for colonoscopy. Patient stated that the referral was needed due to her only wanting the colonoscopy performed without having to establish with their office per their protocol. Please advise.

## 2024-05-31 NOTE — TELEPHONE ENCOUNTER
Patient called the office stating that she would like to have her colonoscopy done at [facility] in [place] with Dr Shayne Rivero. His office stated that the coded that is needed for the referral is Z12.11. Patient also states that the fax number for his office is [phone] and main phone numbeer is [phone]. Please call back at [phone] and advise.

## 2024-08-12 DIAGNOSIS — E78.00 PURE HYPERCHOLESTEROLEMIA: Chronic | ICD-10-CM

## 2024-08-12 DIAGNOSIS — I10 ESSENTIAL HYPERTENSION, BENIGN: Chronic | ICD-10-CM

## 2024-08-12 DIAGNOSIS — E11.9 TYPE 2 DIABETES MELLITUS WITHOUT COMPLICATION, WITHOUT LONG-TERM CURRENT USE OF INSULIN (HCC): Chronic | ICD-10-CM

## 2024-08-13 RX ORDER — METOPROLOL SUCCINATE 50 MG/1
50 TABLET, EXTENDED RELEASE ORAL DAILY
Qty: 90 TABLET | Refills: 0 | Status: SHIPPED | OUTPATIENT
Start: 2024-08-13

## 2024-08-13 RX ORDER — LOSARTAN POTASSIUM 50 MG/1
100 TABLET ORAL DAILY
Qty: 180 TABLET | Refills: 0 | Status: SHIPPED | OUTPATIENT
Start: 2024-08-13

## 2024-08-13 RX ORDER — ATORVASTATIN CALCIUM 20 MG/1
20 TABLET, FILM COATED ORAL NIGHTLY
Qty: 90 TABLET | Refills: 0 | Status: SHIPPED | OUTPATIENT
Start: 2024-08-13

## 2024-11-11 DIAGNOSIS — I10 ESSENTIAL HYPERTENSION, BENIGN: Chronic | ICD-10-CM

## 2024-11-11 DIAGNOSIS — E11.9 TYPE 2 DIABETES MELLITUS WITHOUT COMPLICATION, WITHOUT LONG-TERM CURRENT USE OF INSULIN (HCC): Chronic | ICD-10-CM

## 2024-11-11 DIAGNOSIS — E78.00 PURE HYPERCHOLESTEROLEMIA: Chronic | ICD-10-CM

## 2024-11-12 RX ORDER — ATORVASTATIN CALCIUM 20 MG/1
20 TABLET, FILM COATED ORAL NIGHTLY
Qty: 90 TABLET | Refills: 0 | Status: SHIPPED | OUTPATIENT
Start: 2024-11-12

## 2024-11-12 RX ORDER — AMLODIPINE BESYLATE 10 MG/1
10 TABLET ORAL DAILY
Qty: 90 TABLET | Refills: 1 | Status: SHIPPED | OUTPATIENT
Start: 2024-11-12

## 2024-11-12 RX ORDER — LOSARTAN POTASSIUM 50 MG/1
100 TABLET ORAL DAILY
Qty: 180 TABLET | Refills: 0 | Status: SHIPPED | OUTPATIENT
Start: 2024-11-12

## 2024-11-12 RX ORDER — METOPROLOL SUCCINATE 50 MG/1
50 TABLET, EXTENDED RELEASE ORAL DAILY
Qty: 90 TABLET | Refills: 0 | Status: SHIPPED | OUTPATIENT
Start: 2024-11-12

## 2025-01-02 ENCOUNTER — TELEPHONE (OUTPATIENT)
Dept: INTERNAL MEDICINE CLINIC | Facility: CLINIC | Age: 65
End: 2025-01-02

## 2025-01-02 DIAGNOSIS — E78.00 PURE HYPERCHOLESTEROLEMIA: Chronic | ICD-10-CM

## 2025-01-02 DIAGNOSIS — Z00.00 ROUTINE GENERAL MEDICAL EXAMINATION AT A HEALTH CARE FACILITY: ICD-10-CM

## 2025-01-02 DIAGNOSIS — E08.9 DIABETES MELLITUS DUE TO UNDERLYING CONDITION, CONTROLLED, WITHOUT COMPLICATION, WITHOUT LONG-TERM CURRENT USE OF INSULIN (HCC): ICD-10-CM

## 2025-01-02 NOTE — TELEPHONE ENCOUNTER
Pt requesting lab/s order be available so she may have them done before upcoming visit. She specifically mentioned the A1C. Uses [facility] lab.    Future Appointments   Date Time Provider Department Center   1/30/2025  4:45 PM Kenneth Batres MD [facility] [place]   4/9/2025  8:00 AM [facility]

## 2025-01-10 ENCOUNTER — ORDER TRANSCRIPTION (OUTPATIENT)
Dept: ADMINISTRATIVE | Facility: HOSPITAL | Age: 65
End: 2025-01-10

## 2025-01-10 DIAGNOSIS — Z12.31 ENCOUNTER FOR SCREENING MAMMOGRAM FOR MALIGNANT NEOPLASM OF BREAST: Primary | ICD-10-CM

## 2025-01-20 ENCOUNTER — TELEPHONE (OUTPATIENT)
Dept: INTERNAL MEDICINE CLINIC | Facility: CLINIC | Age: 65
End: 2025-01-20

## 2025-01-20 NOTE — TELEPHONE ENCOUNTER
Patient called requesting a Z-pack , And or medication for cold symptoms. Patient would like a call back regarding this matter .      Phone : [phone]

## 2025-01-20 NOTE — TELEPHONE ENCOUNTER
Spoke with patient to relay recommendations per Dr. Batres. Pt v/u. Will call back if she is not feeling any better.

## 2025-01-20 NOTE — TELEPHONE ENCOUNTER
Spoke with patient to triage. Pt states that symptoms started on Friday. She has been having a cough, runny nose, and sore throat. Pt denies fevers, body aches, chills, shortness of breath. She hasn't taken an at home COVID test- states she doesn't have COVID. Pt has not taken anything OTC except for Vitamin C supplement Emergen-C.     This RN stated that her symptoms may be because of a viral infection. Pt was told to push fluids, rest, use a humidifier to help with symptoms. Pt v/u but would like to see if Dr. Batres will prescribe her a z-pack as when she has these symptoms, a z-pack helps her. This RN states that if this a viral infection, she will have to let it run it's course as antibiotic will not help.     VM: Pt experiencing cough, cold, sore throat. Denies fevers and shortness of breath. This RN recommended supportive care at home as this could be a viral infection, but pt requesting for z-pack. Please advise, TY!

## 2025-02-08 ENCOUNTER — LAB ENCOUNTER (OUTPATIENT)
Dept: LAB | Age: 65
End: 2025-02-08
Attending: INTERNAL MEDICINE
Payer: COMMERCIAL

## 2025-02-08 DIAGNOSIS — E08.9 DIABETES MELLITUS DUE TO UNDERLYING CONDITION, CONTROLLED, WITHOUT COMPLICATION, WITHOUT LONG-TERM CURRENT USE OF INSULIN (HCC): ICD-10-CM

## 2025-02-08 DIAGNOSIS — E78.00 PURE HYPERCHOLESTEROLEMIA: Chronic | ICD-10-CM

## 2025-02-08 DIAGNOSIS — Z00.00 ROUTINE GENERAL MEDICAL EXAMINATION AT A HEALTH CARE FACILITY: ICD-10-CM

## 2025-02-08 LAB
ALBUMIN SERPL-MCNC: 4.5 G/DL (ref 3.2–4.8)
ALBUMIN/GLOB SERPL: 1.3 {RATIO} (ref 1–2)
ALP LIVER SERPL-CCNC: 100 U/L
ALT SERPL-CCNC: 31 U/L
ANION GAP SERPL CALC-SCNC: 12 MMOL/L (ref 0–18)
AST SERPL-CCNC: 32 U/L (ref ?–34)
BASOPHILS # BLD AUTO: 0.05 X10(3) UL (ref 0–0.2)
BASOPHILS NFR BLD AUTO: 0.7 %
BILIRUB SERPL-MCNC: 0.7 MG/DL (ref 0.2–1.1)
BILIRUB UR QL STRIP.AUTO: NEGATIVE
BUN BLD-MCNC: 13 MG/DL (ref 9–23)
CALCIUM BLD-MCNC: 9.5 MG/DL (ref 8.7–10.6)
CHLORIDE SERPL-SCNC: 103 MMOL/L (ref 98–112)
CHOLEST SERPL-MCNC: 141 MG/DL (ref ?–200)
CO2 SERPL-SCNC: 27 MMOL/L (ref 21–32)
COLOR UR AUTO: YELLOW
CREAT BLD-MCNC: 1.03 MG/DL
CREAT UR-SCNC: 171.1 MG/DL
EGFRCR SERPLBLD CKD-EPI 2021: 61 ML/MIN/1.73M2 (ref 60–?)
EOSINOPHIL # BLD AUTO: 0.16 X10(3) UL (ref 0–0.7)
EOSINOPHIL NFR BLD AUTO: 2.4 %
ERYTHROCYTE [DISTWIDTH] IN BLOOD BY AUTOMATED COUNT: 15.3 %
EST. AVERAGE GLUCOSE BLD GHB EST-MCNC: 140 MG/DL (ref 68–126)
FASTING PATIENT LIPID ANSWER: YES
FASTING STATUS PATIENT QL REPORTED: YES
GLOBULIN PLAS-MCNC: 3.4 G/DL (ref 2–3.5)
GLUCOSE BLD-MCNC: 104 MG/DL (ref 70–99)
GLUCOSE UR STRIP.AUTO-MCNC: NORMAL MG/DL
HBA1C MFR BLD: 6.5 % (ref ?–5.7)
HCT VFR BLD AUTO: 41.2 %
HDLC SERPL-MCNC: 54 MG/DL (ref 40–59)
HGB BLD-MCNC: 13.5 G/DL
IMM GRANULOCYTES # BLD AUTO: 0.02 X10(3) UL (ref 0–1)
IMM GRANULOCYTES NFR BLD: 0.3 %
KETONES UR STRIP.AUTO-MCNC: NEGATIVE MG/DL
LDLC SERPL CALC-MCNC: 74 MG/DL (ref ?–100)
LEUKOCYTE ESTERASE UR QL STRIP.AUTO: NEGATIVE
LYMPHOCYTES # BLD AUTO: 2.79 X10(3) UL (ref 1–4)
LYMPHOCYTES NFR BLD AUTO: 41.5 %
MCH RBC QN AUTO: 29.6 PG (ref 26–34)
MCHC RBC AUTO-ENTMCNC: 32.8 G/DL (ref 31–37)
MCV RBC AUTO: 90.4 FL
MICROALBUMIN UR-MCNC: 1.5 MG/DL
MICROALBUMIN/CREAT 24H UR-RTO: 8.8 UG/MG (ref ?–30)
MONOCYTES # BLD AUTO: 0.99 X10(3) UL (ref 0.1–1)
MONOCYTES NFR BLD AUTO: 14.7 %
NEUTROPHILS # BLD AUTO: 2.72 X10 (3) UL (ref 1.5–7.7)
NEUTROPHILS # BLD AUTO: 2.72 X10(3) UL (ref 1.5–7.7)
NEUTROPHILS NFR BLD AUTO: 40.4 %
NITRITE UR QL STRIP.AUTO: NEGATIVE
NONHDLC SERPL-MCNC: 87 MG/DL (ref ?–130)
OSMOLALITY SERPL CALC.SUM OF ELEC: 294 MOSM/KG (ref 275–295)
PH UR STRIP.AUTO: 7 [PH] (ref 5–8)
PLATELET # BLD AUTO: 300 10(3)UL (ref 150–450)
POTASSIUM SERPL-SCNC: 4 MMOL/L (ref 3.5–5.1)
PROT SERPL-MCNC: 7.9 G/DL (ref 5.7–8.2)
RBC # BLD AUTO: 4.56 X10(6)UL
RBC UR QL AUTO: NEGATIVE
SODIUM SERPL-SCNC: 142 MMOL/L (ref 136–145)
SP GR UR STRIP.AUTO: 1.01 (ref 1–1.03)
T4 SERPL-MCNC: 7 UG/DL
TRIGL SERPL-MCNC: 61 MG/DL (ref 30–149)
TSI SER-ACNC: 1.15 UIU/ML (ref 0.55–4.78)
UROBILINOGEN UR STRIP.AUTO-MCNC: NORMAL MG/DL
VLDLC SERPL CALC-MCNC: 9 MG/DL (ref 0–30)
WBC # BLD AUTO: 6.7 X10(3) UL (ref 4–11)

## 2025-02-08 PROCEDURE — 80053 COMPREHEN METABOLIC PANEL: CPT

## 2025-02-08 PROCEDURE — 82570 ASSAY OF URINE CREATININE: CPT

## 2025-02-08 PROCEDURE — 83036 HEMOGLOBIN GLYCOSYLATED A1C: CPT

## 2025-02-08 PROCEDURE — 84443 ASSAY THYROID STIM HORMONE: CPT

## 2025-02-08 PROCEDURE — 84436 ASSAY OF TOTAL THYROXINE: CPT

## 2025-02-08 PROCEDURE — 85025 COMPLETE CBC W/AUTO DIFF WBC: CPT

## 2025-02-08 PROCEDURE — 81001 URINALYSIS AUTO W/SCOPE: CPT

## 2025-02-08 PROCEDURE — 80061 LIPID PANEL: CPT

## 2025-02-08 PROCEDURE — 82043 UR ALBUMIN QUANTITATIVE: CPT

## 2025-02-08 PROCEDURE — 36415 COLL VENOUS BLD VENIPUNCTURE: CPT

## 2025-02-10 ENCOUNTER — OFFICE VISIT (OUTPATIENT)
Dept: INTERNAL MEDICINE CLINIC | Facility: CLINIC | Age: 65
End: 2025-02-10
Payer: COMMERCIAL

## 2025-02-10 VITALS
DIASTOLIC BLOOD PRESSURE: 80 MMHG | HEART RATE: 74 BPM | SYSTOLIC BLOOD PRESSURE: 122 MMHG | BODY MASS INDEX: 29.41 KG/M2 | RESPIRATION RATE: 16 BRPM | TEMPERATURE: 97 F | HEIGHT: 66 IN | WEIGHT: 183 LBS | OXYGEN SATURATION: 100 %

## 2025-02-10 DIAGNOSIS — E78.00 PURE HYPERCHOLESTEROLEMIA: Chronic | ICD-10-CM

## 2025-02-10 DIAGNOSIS — I10 ESSENTIAL HYPERTENSION, BENIGN: Chronic | ICD-10-CM

## 2025-02-10 DIAGNOSIS — E08.9 DIABETES MELLITUS DUE TO UNDERLYING CONDITION, CONTROLLED, WITHOUT COMPLICATION, WITHOUT LONG-TERM CURRENT USE OF INSULIN (HCC): ICD-10-CM

## 2025-02-10 DIAGNOSIS — E11.9 TYPE 2 DIABETES MELLITUS WITHOUT COMPLICATION, WITHOUT LONG-TERM CURRENT USE OF INSULIN (HCC): Chronic | ICD-10-CM

## 2025-02-10 DIAGNOSIS — Z00.00 ROUTINE GENERAL MEDICAL EXAMINATION AT A HEALTH CARE FACILITY: Primary | ICD-10-CM

## 2025-02-10 LAB
ATRIAL RATE: 55 BPM
P AXIS: 46 DEGREES
P-R INTERVAL: 220 MS
Q-T INTERVAL: 450 MS
QRS DURATION: 102 MS
QTC CALCULATION (BEZET): 430 MS
R AXIS: -40 DEGREES
T AXIS: 24 DEGREES
VENTRICULAR RATE: 55 BPM

## 2025-02-10 RX ORDER — LOSARTAN POTASSIUM 50 MG/1
100 TABLET ORAL DAILY
Qty: 180 TABLET | Refills: 0 | Status: SHIPPED | OUTPATIENT
Start: 2025-02-10

## 2025-02-10 RX ORDER — ATORVASTATIN CALCIUM 20 MG/1
20 TABLET, FILM COATED ORAL NIGHTLY
Qty: 90 TABLET | Refills: 0 | Status: SHIPPED | OUTPATIENT
Start: 2025-02-10

## 2025-02-10 RX ORDER — METOPROLOL SUCCINATE 50 MG/1
50 TABLET, EXTENDED RELEASE ORAL DAILY
Qty: 90 TABLET | Refills: 0 | Status: SHIPPED | OUTPATIENT
Start: 2025-02-10

## 2025-02-10 NOTE — PROGRESS NOTES
Lianet Fan  1960 is a 64 year old female.    Chief Complaint   Patient presents with    Physical       HPI:   Feels ok no cc  Current Outpatient Medications   Medication Sig Dispense Refill    AMLODIPINE 10 MG Oral Tab Take 1 tablet by mouth every day. 90 tablet 1    ATORVASTATIN 20 MG Oral Tab Take 1 tablet (20 mg total) by mouth nightly. 90 tablet 0    LOSARTAN 50 MG Oral Tab Take 2 tablets (100 mg total) by mouth daily. 180 tablet 0    METFORMIN 500 MG Oral Tab Take 1 tablet (500 mg total) by mouth 2 (two) times daily. 180 tablet 0    METOPROLOL SUCCINATE ER 50 MG Oral Tablet 24 Hr Take 1 tablet (50 mg total) by mouth daily. 90 tablet 0    diclofenac 50 MG Oral Tab EC Take 1 tablet (50 mg total) by mouth as needed. 30 tablet 3    fluticasone furoate-vilanterol (BREO ELLIPTA) 200-25 MCG/ACT Inhalation Aerosol Powder, Breath Activated Inhale 1 puff into the lungs daily. 1 each 0    ASPIRIN LOW DOSE 81 MG Oral Tab EC TAKE 1 TABLET BY MOUTH DAILY 30 tablet 0      Past Medical History:    Baker's cyst of knee    bilateral    Baker's cyst of knee    bilateral     Pure hypercholesterolemia    Unspecified essential hypertension    benign      Social History:  Social History     Socioeconomic History    Marital status: Single   Tobacco Use    Smoking status: Never    Smokeless tobacco: Never   Vaping Use    Vaping status: Never Used   Substance and Sexual Activity    Alcohol use: Yes     Alcohol/week: 0.0 standard drinks of alcohol     Comment: Socially     Drug use: No    Sexual activity: Yes     Social Drivers of Health      [facility]    Housing Stability        REVIEW OF SYSTEMS:     General/Constitutional:   General able to do usual activities, good exercise tolerance, good general state of health, no fatigue, no fever, no weakness, no weight loss or gain .   HEENT/Neck:   Head no headache, no dizziness, no lightheadedness. Eyes normal  vision, no redness, no drainage. Ears no earaches, no fullness, normal hearing, no tinnitus. Nose and Sinuses no recurrent colds, , no itching, no hay fever, no nosebleeds. Mouth and Pharynx no sore throats, no hoarseness. Neck no lumps, no goiter, no neck stiffness or pain.   Stuffy nose with sneezing   Endocrine:   Diabetes yes . Thyroid disorder none.   Respiratory:   Patient denies chest pain, , WILL (dyspnea on exertion), chest congestion, blood-tinged sputum/wheezing. Breathing normal pattern .   Cardiovascular:   Patient denies chest pain, shortness of breath/rheumatic fever/murmur/dizzziness cholesterol normal. Leg edema none. Orthopnea none. Palpitations none. PND (paroxsymal nocturnal dyspnea) none. Exercise none   Gastrointestinal:   Patient denies abdominal pain, blood in stool, constipation, diarrhea, difficulty swallowing, change in stools, heartburn, nausea, vomiting    no heart burn noted.   Hematology:   Patient denies abnormal bleeding, easy bleeding, easy bruising. Enlarged lymph nodes none.   Genitourinary:   Patient denies difficulty urinating, frequent urination, hematuria. Dysuria none. Nocturia None. no Urinary frequency. no Urinary incontinence.   Musculoskeletal:   Arthritis None. Back pain stable . Joint pain occ left knee pain Joint stiffness no. Muscle weakness none.   Peripheral Vascular:   General no varicosities, no claudication.   Dermatologic:   Rash none.   Neurologic:   Patient denies dizziness, fainting, loss of consciousness, weakness. Memory loss none. Tingling/numbness none. Trouble with balance none.   Psychiatric:   Patient denies depression, hallucinations, memory loss. no Anxiety, none. Insomnia none.              EXAM:   /80 (BP Location: Right arm, Patient Position: Sitting, Cuff Size: adult)   Pulse 74   Temp 97.3 °F (36.3 °C) (Temporal)   Resp 16   Ht 5' 6\" (1.676 m)   Wt 183 lb (83 kg)   LMP 06/12/2015   SpO2 100%   BMI 29.54 kg/m²   GENERAL:   Build:  normal .   General Appearance: alert and oriented, pleasant.   HEENT:   Ear canals: normal.   EOM: within normal limit-conjunctiva normal.     Head: normocephalic.   Nasal septum: midline.   Nose: unremarkable.   Oral cavity: normal.   Pupils: normal, bilaterally .   Sclera: normal.   Turbinates: normal.   NECK:   Carotid bruit: none.   Cervical lymph nodes: unremarkable.   JVD: none.   Range of Motion: normal.   Thyroid: unremarkable.   HEART:   Clicks: absent .   Edema: none visible .   Heart sounds: normal.   Murmurs: none.   Rhythm: regular.   LUNGS:   Auscultation: clear .   Chest Shape: normal .   Percussion: normal.   Rales: no .   Respiratory effort: normal .     Infrequent wheeze  ABDOMEN:   General: normal.   Hernia: absent.   Inguinal nodes: none.   Liver, Spleen: non-enlarged.   Rebound tenderness: absent.   Tenderness: absent .   EXTREMITIES:   Clubbing: none.   Cyanosis: absent .   Edema: none.   Pulses: present, bilateral.   Tremors: no.   Varicose veins: not present.   MUSCULOSKELETAL:   Cervical spines: normal.   L-S spines: normal.   Lower extremity joints: normal.   Upper extremity joints: normal.   NEUROLOGICAL:   Babinski: negative/all reflexes are normal.   Cerebellar Testing grossly/intact: yes.   Gait: normal.   Motor: power-normal/tone -normal/co-ordination normal/wasting -none/involuntary movements -none.   Sensory: normal sensation to all modalities.   LYMPHATICS:   Groin: no adenopathy .   Inguinal: no adenopathy.   Supraclavicular: none.   DERMATOLOGY:   Rash: no.     To see gyn         ASSESSMENT AND PLAN:   Lianet was seen today for physical.    Diagnoses and all orders for this visit:    Routine general medical examination at a health care facility  -     Gastro Referral - In Network  -     XR DEXA BONE DENSITOMETRY (CPT=77080); Future    Pure hypercholesterolemia  -     CT CALCIUM SCORING; Future  -     Refer to Opthalmology    Essential hypertension, benign  -     EKG with  interpretation and Report -IN OFFICE [85741]  -     CT CALCIUM SCORING; Future  -     Refer to Opthalmology    Diabetes mellitus due to underlying condition, controlled, without complication, without long-term current use of insulin (HCC)  -     EKG with interpretation and Report -IN OFFICE [44391]  -     CT CALCIUM SCORING; Future  -     Refer to Opthalmology        Patient Instructions   See me after all tests lab work discussed with patient   The patient indicates understanding of these issues and agrees to the plan.  The patient is asked to Return in about 4 weeks (around 3/10/2025).  .      Kenneth Batres MD

## 2025-02-10 NOTE — TELEPHONE ENCOUNTER
Name from pharmacy: atorvastatin 20 mg tablet (LIPITOR)         Will file in chart as: ATORVASTATIN 20 MG Oral Tab    Sig: Take 1 tablet (20 mg total) by mouth nightly.    Disp: 90 tablet    Refills: 0    Start: 2/10/2025    Class: Normal    For: Pure hypercholesterolemia    Last ordered: 3 months ago (11/12/2024) by Kenneth Batres MD    Last refill: 11/12/2024    Rx #: [id number]:[id number]    Cholesterol Medication Protocol Tfrmrm49/10/2025 05:43 AM   Protocol Details ALT < 80    ALT resulted within past year    Lipid panel within past 12 months    In person appointment or virtual visit in the past 12 mos or appointment in next 3 mos    Medication is active on med list       Name from pharmacy: losartan 50 mg tablet (COZAAR)         Will file in chart as: LOSARTAN 50 MG Oral Tab    Sig: Take 2 tablets (100 mg total) by mouth daily.    Disp: 180 tablet    Refills: 0    Start: 2/10/2025    Class: Normal    Last ordered: 3 months ago (11/12/2024) by Kenneth Batres MD    Last refill: 11/12/2024    Rx #: [id number]    Hypertension Medications Protocol Jqatum04/10/2025 05:43 AM   Protocol Details CMP or BMP in past 12 months    Last BP reading less than 140/90    In person appointment or virtual visit in the past 12 mos or appointment in next 3 mos    EGFRCR or GFRAA > 50    Medication is active on med list       Name from pharmacy: metFORMIN 500 mg tablet (Glucophage)         Will file in chart as: METFORMIN 500 MG Oral Tab    Sig: Take 1 tablet (500 mg total) by mouth 2 (two) times daily.    Disp: 180 tablet    Refills: 0    Start: 2/10/2025    Class: Normal    For: Type 2 diabetes mellitus without complication, without long-term current use of insulin (HCC)    Last ordered: 3 months ago (11/12/2024) by Kenneth Batres MD    Last refill: 11/12/2024    Rx #: [id number]    Diabetes Medication Protocol Smnuxc42/10/2025 05:43 AM   Protocol Details In person appointment or virtual visit in the past 6 mos or appointment in  next 3 mos    Last A1C < 7.5 and within past 6 months    Microalbumin procedure in past 12 months or taking ACE/ARB    EGFRCR or GFRAA > 50    GFR in the past 12 months    Medication is active on med list       Name from pharmacy: metoprolol succinate ER 50 mg tablet,extended release 24 hr (TOPROL XL)         Will file in chart as: METOPROLOL SUCCINATE ER 50 MG Oral Tablet 24 Hr    Sig: Take 1 tablet (50 mg total) by mouth daily.    Disp: 90 tablet    Refills: 0    Start: 2/10/2025    Class: Normal    For: Essential hypertension, benign    Last ordered: 3 months ago (11/12/2024) by Kenneth Batres MD    Last refill: 11/12/2024    Rx #: [id number]    Hypertension Medications Protocol Abwinx85/10/2025 05:43 AM   Protocol Details CMP or BMP in past 12 months    Last BP reading less than 140/90    In person appointment or virtual visit in the past 12 mos or appointment in next 3 mos    EGFRCR or GFRAA > 50    Medication is active on med list      To be filled at: [facility] - [place], [place] - [address] [place] [address] [phone], [phone]

## 2025-04-12 ENCOUNTER — HOSPITAL ENCOUNTER (OUTPATIENT)
Dept: MAMMOGRAPHY | Age: 65
Discharge: HOME | End: 2025-04-12
Attending: INTERNAL MEDICINE
Payer: COMMERCIAL

## 2025-04-12 DIAGNOSIS — Z12.31 ENCOUNTER FOR SCREENING MAMMOGRAM FOR MALIGNANT NEOPLASM OF BREAST: ICD-10-CM

## 2025-04-12 PROCEDURE — 77063 BREAST TOMOSYNTHESIS BI: CPT | Performed by: INTERNAL MEDICINE

## 2025-04-12 PROCEDURE — 77067 SCR MAMMO BI INCL CAD: CPT | Performed by: INTERNAL MEDICINE

## 2025-04-21 ENCOUNTER — OFFICE VISIT (OUTPATIENT)
Dept: INTERNAL MEDICINE CLINIC | Facility: CLINIC | Age: 65
End: 2025-04-21
Payer: COMMERCIAL

## 2025-04-21 VITALS
HEIGHT: 66 IN | WEIGHT: 183 LBS | SYSTOLIC BLOOD PRESSURE: 128 MMHG | DIASTOLIC BLOOD PRESSURE: 72 MMHG | BODY MASS INDEX: 29.41 KG/M2 | TEMPERATURE: 97 F | OXYGEN SATURATION: 98 % | RESPIRATION RATE: 16 BRPM | HEART RATE: 78 BPM

## 2025-04-21 DIAGNOSIS — M79.605 PAIN OF LEFT LOWER EXTREMITY: Primary | ICD-10-CM

## 2025-04-21 PROCEDURE — 3008F BODY MASS INDEX DOCD: CPT | Performed by: INTERNAL MEDICINE

## 2025-04-21 PROCEDURE — 3078F DIAST BP <80 MM HG: CPT | Performed by: INTERNAL MEDICINE

## 2025-04-21 PROCEDURE — 99214 OFFICE O/P EST MOD 30 MIN: CPT | Performed by: INTERNAL MEDICINE

## 2025-04-21 PROCEDURE — 3074F SYST BP LT 130 MM HG: CPT | Performed by: INTERNAL MEDICINE

## 2025-04-21 NOTE — PATIENT INSTRUCTIONS
Pain seems to be predominantly musculoligamentous could be related to her bilateral pes planus collapsed arch along with internal derangement of the left knee a subtle disc herniation cannot be eliminated.  Patient at this moment is just concerned about a possible DVT and wants that elevated

## 2025-04-21 NOTE — PROGRESS NOTES
Lianet Fan  1960 is a 64 year old female.    Chief Complaint   Patient presents with    Follow - Up       HPI:   Pain in feet often rating up to the back patient concerned about DVT.  Symptoms going on for the last few months  Current Medications[1]   Past Medical History[2]   Social History:  Short Social Hx on File[3]     REVIEW OF SYSTEMS:     Respiratory:   Coughing up blood no. Chest congestion none. Cough none. WILL (dyspnea on exertion) none. Pain with breathing none. Wheezing none.   Cardiovascular:   Syncope none. Rapid heart beat at rest no. Change in exercise tolerance no. Chest pain no. Cold extremities no. Irregular heart beat no. Leg edema no.   Musculoskeletal:   Patient denies leg claudication, morning stiffness , muscle cramping , pain with movement , swelling/bruising, muscle aches.           EXAM:   /72 (BP Location: Right arm, Patient Position: Sitting, Cuff Size: adult)   Pulse 78   Temp 97.4 °F (36.3 °C) (Temporal)   Resp 16   Ht 5' 6\" (1.676 m)   Wt 183 lb (83 kg)   LMP 2015   SpO2 98%   BMI 29.54 kg/m²   No anemia cyanosis clubbing   CHEST:   Breath sounds: normal.   Shape and expansion: normal.   Wheezes: none.   HEART:   Clicks: no.   Distal Pulses Palpable: yes.   Edema: none visible .   Gallop: no .   Heart sounds: normal S1S2.   Murmurs: none.   Rhythm: regular.   EXTREMITIES:   Skin: normal.   Varicose veins: not present.   Arterial pulses well felt  MUSCULOSKELETAL:   Ankle: unremarkable, FROM.  Patient does have bilateral pes planus the left is worse than the right  Foot: unremarkable, no tenderness, swelling or redness, FROM.   Knee unremarkable, no tenderness, swelling or redness, FROM.  Slightly tender movements.  Patient has a history of internal derangement of the left knee  Leg: no swelling, no edema, no tenderness, no palpable cords, , no redness or warmth.   Left SLR is slightly tender        ASSESSMENT AND PLAN:   Lianet was seen today for follow  - up.    Diagnoses and all orders for this visit:    Pain of left lower extremity  -     US VENOUS DOPPLER LEG LEFT - DIAG IMG (CPT=93971); Future         Patient Instructions   Pain seems to be predominantly musculoligamentous could be related to her bilateral pes planus collapsed arch along with internal derangement of the left knee a subtle disc herniation cannot be eliminated.  Patient at this moment is just concerned about a possible DVT and wants that elevated   The patient indicates understanding of these issues and agrees to the plan.  The patient is asked to No follow-ups on file.  .      Kenneth Batres MD          [1]   Current Outpatient Medications   Medication Sig Dispense Refill    ATORVASTATIN 20 MG Oral Tab Take 1 tablet (20 mg total) by mouth nightly. 90 tablet 0    LOSARTAN 50 MG Oral Tab Take 2 tablets (100 mg total) by mouth daily. 180 tablet 0    METFORMIN 500 MG Oral Tab Take 1 tablet (500 mg total) by mouth 2 (two) times daily. 180 tablet 0    METOPROLOL SUCCINATE ER 50 MG Oral Tablet 24 Hr Take 1 tablet (50 mg total) by mouth daily. 90 tablet 0    AMLODIPINE 10 MG Oral Tab Take 1 tablet by mouth every day. 90 tablet 1    diclofenac 50 MG Oral Tab EC Take 1 tablet (50 mg total) by mouth as needed. 30 tablet 3    fluticasone furoate-vilanterol (BREO ELLIPTA) 200-25 MCG/ACT Inhalation Aerosol Powder, Breath Activated Inhale 1 puff into the lungs daily. 1 each 0    ASPIRIN LOW DOSE 81 MG Oral Tab EC TAKE 1 TABLET BY MOUTH DAILY 30 tablet 0   [2]   Past Medical History:   Baker's cyst of knee    bilateral    Baker's cyst of knee    bilateral     Pure hypercholesterolemia    Unspecified essential hypertension    benign   [3]   Social History  Socioeconomic History    Marital status: Single   Tobacco Use    Smoking status: Never    Smokeless tobacco: Never   Vaping Use    Vaping status: Never Used   Substance and Sexual Activity    Alcohol use: Yes     Alcohol/week: 0.0 standard drinks of alcohol      Comment: Socially     Drug use: No    Sexual activity: Yes     Social Drivers of Health      Received from [facility]    Housing Stability

## 2025-04-21 NOTE — PROGRESS NOTES
Lianet Fan  1960 is a 64 year old female.    Chief Complaint   Patient presents with    Follow - Up       HPI:     Current Medications[1]   Past Medical History[2]   Social History:  Short Social Hx on File[3]     REVIEW OF SYSTEMS:     Respiratory:   Coughing up blood no. Chest congestion none. Cough none. WILL (dyspnea on exertion) none. Pain with breathing none. Wheezing none.   Cardiovascular:   Syncope none. Rapid heart beat at rest no. Change in exercise tolerance no. Chest pain no. Cold extremities no. Irregular heart beat no. Leg edema no.   Musculoskeletal:   Patient denies leg claudication, morning stiffness , muscle cramping , pain with movement , swelling/bruising, muscle aches.           EXAM:   /72 (BP Location: Right arm, Patient Position: Sitting, Cuff Size: adult)   Pulse 78   Temp 97.4 °F (36.3 °C) (Temporal)   Resp 16   Ht 5' 6\" (1.676 m)   Wt 183 lb (83 kg)   LMP 2015   SpO2 98%   BMI 29.54 kg/m²   No anemia cyanosis clubbing   CHEST:   Breath sounds: normal.   Shape and expansion: normal.   Wheezes: none.   HEART:   Clicks: no.   Distal Pulses Palpable: yes.   Edema: none visible .   Gallop: no .   Heart sounds: normal S1S2.   Murmurs: none.   Rhythm: regular.   EXTREMITIES:   Skin: normal.   Varicose veins: not present.   Arterial pulses well felt  MUSCULOSKELETAL:   Ankle: unremarkable, FROM.   Foot: unremarkable, no tenderness, swelling or redness, FROM.   Knee unremarkable, no tenderness, swelling or redness, FROM.   Leg: no swelling, no edema, no tenderness, no palpable cords, , no redness or warmth.           ASSESSMENT AND PLAN:   Lianet was seen today for follow - up.    Diagnoses and all orders for this visit:    Pain of left lower extremity  -     US VENOUS DOPPLER LEG LEFT - DIAG IMG (CPT=93971); Future       dvt -  (Primary)  There are no Patient Instructions on file for this visit.   The patient indicates understanding of these issues and agrees to the  plan.  The patient is asked to No follow-ups on file.  .      Kenneth Batres MD          [1]   Current Outpatient Medications   Medication Sig Dispense Refill    ATORVASTATIN 20 MG Oral Tab Take 1 tablet (20 mg total) by mouth nightly. 90 tablet 0    LOSARTAN 50 MG Oral Tab Take 2 tablets (100 mg total) by mouth daily. 180 tablet 0    METFORMIN 500 MG Oral Tab Take 1 tablet (500 mg total) by mouth 2 (two) times daily. 180 tablet 0    METOPROLOL SUCCINATE ER 50 MG Oral Tablet 24 Hr Take 1 tablet (50 mg total) by mouth daily. 90 tablet 0    AMLODIPINE 10 MG Oral Tab Take 1 tablet by mouth every day. 90 tablet 1    diclofenac 50 MG Oral Tab EC Take 1 tablet (50 mg total) by mouth as needed. 30 tablet 3    fluticasone furoate-vilanterol (BREO ELLIPTA) 200-25 MCG/ACT Inhalation Aerosol Powder, Breath Activated Inhale 1 puff into the lungs daily. 1 each 0    ASPIRIN LOW DOSE 81 MG Oral Tab EC TAKE 1 TABLET BY MOUTH DAILY 30 tablet 0   [2]   Past Medical History:   Baker's cyst of knee    bilateral    Baker's cyst of knee    bilateral     Pure hypercholesterolemia    Unspecified essential hypertension    benign   [3]   Social History  Socioeconomic History    Marital status: Single   Tobacco Use    Smoking status: Never    Smokeless tobacco: Never   Vaping Use    Vaping status: Never Used   Substance and Sexual Activity    Alcohol use: Yes     Alcohol/week: 0.0 standard drinks of alcohol     Comment: Socially     Drug use: No    Sexual activity: Yes     Social Drivers of Health      Received from [facility]    Housing Stability

## 2025-04-23 ENCOUNTER — HOSPITAL ENCOUNTER (OUTPATIENT)
Dept: ULTRASOUND IMAGING | Age: 65
Discharge: HOME | End: 2025-04-23
Attending: INTERNAL MEDICINE
Payer: COMMERCIAL

## 2025-04-23 DIAGNOSIS — M79.605 PAIN OF LEFT LOWER EXTREMITY: ICD-10-CM

## 2025-04-23 PROCEDURE — 93971 EXTREMITY STUDY: CPT | Performed by: INTERNAL MEDICINE

## 2025-05-01 DIAGNOSIS — E78.00 PURE HYPERCHOLESTEROLEMIA: Chronic | ICD-10-CM

## 2025-05-01 DIAGNOSIS — E11.9 TYPE 2 DIABETES MELLITUS WITHOUT COMPLICATION, WITHOUT LONG-TERM CURRENT USE OF INSULIN (HCC): Chronic | ICD-10-CM

## 2025-05-01 DIAGNOSIS — I10 ESSENTIAL HYPERTENSION, BENIGN: Chronic | ICD-10-CM

## 2025-05-01 RX ORDER — LOSARTAN POTASSIUM 50 MG/1
100 TABLET ORAL DAILY
Qty: 180 TABLET | Refills: 0 | Status: SHIPPED
Start: 2025-05-01

## 2025-05-01 RX ORDER — ATORVASTATIN CALCIUM 20 MG/1
20 TABLET, FILM COATED ORAL NIGHTLY
Qty: 90 TABLET | Refills: 0 | Status: SHIPPED
Start: 2025-05-01

## 2025-05-01 RX ORDER — METOPROLOL SUCCINATE 50 MG/1
50 TABLET, EXTENDED RELEASE ORAL DAILY
Qty: 90 TABLET | Refills: 0 | Status: SHIPPED
Start: 2025-05-01

## 2025-05-01 RX ORDER — AMLODIPINE BESYLATE 10 MG/1
10 TABLET ORAL DAILY
Qty: 90 TABLET | Refills: 1 | Status: SHIPPED
Start: 2025-05-01

## 2025-05-01 NOTE — TELEPHONE ENCOUNTER
Name from pharmacy: amLODIPine 10 mg tablet (Norvasc)          Will file in chart as: AMLODIPINE 10 MG Oral Tab    Sig: Take 1 tablet by mouth every day.    Disp: 90 tablet    Refills: 1    Start: 5/1/2025    Class: Normal    Non-formulary For: Essential hypertension, benign    Last ordered: 5 months ago (11/12/2024) by Kenneth Batres MD    Last refill: 2/10/2025    Rx #: [id number]:86    Hypertension Medications Protocol Rjhqyg3905/01/2025 08:44 AM   Protocol Details CMP or BMP in past 12 months    Last BP reading less than 140/90    In person appointment or virtual visit in the past 12 mos or appointment in next 3 mos    EGFRCR or GFRAA > 50    Medication is active on med list       Name from pharmacy: atorvastatin 20 mg tablet (LIPITOR)         Will file in chart as: ATORVASTATIN 20 MG Oral Tab    Sig: Take 1 tablet (20 mg total) by mouth nightly.    Disp: 90 tablet    Refills: 0    Start: 5/1/2025    Class: Normal    Non-formulary For: Pure hypercholesterolemia    Last ordered: 2 months ago (2/10/2025) by Kenneth Batres MD    Last refill: 2/11/2025    Rx #: [id number]:[id number]    Cholesterol Medication Protocol Tkugdw6205/01/2025 08:44 AM   Protocol Details ALT < 80    ALT resulted within past year    Lipid panel within past 12 months    In person appointment or virtual visit in the past 12 mos or appointment in next 3 mos    Medication is active on med list       Name from pharmacy: losartan 50 mg tablet (COZAAR)         Will file in chart as: LOSARTAN 50 MG Oral Tab    Sig: Take 2 tablets (100 mg total) by mouth daily.    Disp: 180 tablet    Refills: 0    Start: 5/1/2025    Class: Normal    Non-formulary    Last ordered: 2 months ago (2/10/2025) by Kenneth Bartes MD    Last refill: 2/11/2025    Rx #: [id number]    Hypertension Medications Protocol Eobfvv5305/01/2025 08:44 AM   Protocol Details CMP or BMP in past 12 months    Last BP reading less than 140/90    In person appointment or virtual visit in the past 12  mos or appointment in next 3 mos    EGFRCR or GFRAA > 50    Medication is active on med list       Name from pharmacy: metFORMIN 500 mg tablet (Glucophage)         Will file in chart as: METFORMIN 500 MG Oral Tab    Sig: Take 1 tablet (500 mg total) by mouth 2 (two) times daily.    Disp: 180 tablet    Refills: 0    Start: 5/1/2025    Class: Normal    Non-formulary For: Type 2 diabetes mellitus without complication, without long-term current use of insulin (HCC)    Last ordered: 2 months ago (2/10/2025) by Kenneth Batres MD    Last refill: 2/11/2025    Rx #: [id number]:[id number]    Diabetes Medication Protocol Iaglon2505/01/2025 08:44 AM   Protocol Details Last A1C < 7.5 and within past 6 months    In person appointment or virtual visit in the past 6 mos or appointment in next 3 mos    Microalbumin procedure in past 12 months or taking ACE/ARB    EGFRCR or GFRAA > 50    GFR in the past 12 months    Medication is active on med list       Name from pharmacy: metoprolol succinate ER 50 mg tablet,extended release 24 hr (TOPROL XL)         Will file in chart as: METOPROLOL SUCCINATE ER 50 MG Oral Tablet 24 Hr    Sig: Take 1 tablet (50 mg total) by mouth daily.    Disp: 90 tablet    Refills: 0    Start: 5/1/2025    Class: Normal    Non-formulary For: Essential hypertension, benign    Last ordered: 2 months ago (2/10/2025) by Kenneth Batres MD    Last refill: 2/11/2025    Rx #: [id number]    Hypertension Medications Protocol Yxkmzk4805/01/2025 08:44 AM   Protocol Details CMP or BMP in past 12 months    Last BP reading less than 140/90    In person appointment or virtual visit in the past 12 mos or appointment in next 3 mos    EGFRCR or GFRAA > 50    Medication is active on med list      To be filled at: [facility] - [place], [place] - [address] [place] [address] [phone], [phone]

## 2025-06-25 ENCOUNTER — LAB ENCOUNTER (OUTPATIENT)
Dept: LAB | Facility: REFERENCE LAB | Age: 65
End: 2025-06-25
Attending: OBSTETRICS & GYNECOLOGY
Payer: COMMERCIAL

## 2025-06-25 ENCOUNTER — TELEPHONE (OUTPATIENT)
Dept: OBGYN CLINIC | Facility: CLINIC | Age: 65
End: 2025-06-25

## 2025-06-25 ENCOUNTER — OFFICE VISIT (OUTPATIENT)
Dept: OBGYN CLINIC | Facility: CLINIC | Age: 65
End: 2025-06-25
Payer: COMMERCIAL

## 2025-06-25 VITALS
HEART RATE: 62 BPM | WEIGHT: 174 LBS | SYSTOLIC BLOOD PRESSURE: 127 MMHG | BODY MASS INDEX: 28 KG/M2 | DIASTOLIC BLOOD PRESSURE: 76 MMHG

## 2025-06-25 DIAGNOSIS — R30.0 DYSURIA: ICD-10-CM

## 2025-06-25 DIAGNOSIS — Z01.419 ENCOUNTER FOR GYNECOLOGICAL EXAMINATION WITHOUT ABNORMAL FINDING: ICD-10-CM

## 2025-06-25 DIAGNOSIS — R30.0 DYSURIA: Primary | ICD-10-CM

## 2025-06-25 DIAGNOSIS — N94.89 VULVAR BURNING: ICD-10-CM

## 2025-06-25 DIAGNOSIS — D25.9 UTERINE LEIOMYOMA, UNSPECIFIED LOCATION: ICD-10-CM

## 2025-06-25 DIAGNOSIS — Z12.31 VISIT FOR SCREENING MAMMOGRAM: ICD-10-CM

## 2025-06-25 LAB
BILIRUB UR QL: NEGATIVE
CLARITY UR: CLEAR
COLOR UR: YELLOW
GLUCOSE UR-MCNC: NORMAL MG/DL
HGB UR QL STRIP.AUTO: NEGATIVE
KETONES UR-MCNC: NEGATIVE MG/DL
LEUKOCYTE ESTERASE UR QL STRIP.AUTO: NEGATIVE
NITRITE UR QL STRIP.AUTO: NEGATIVE
PH UR: 6.5 [PH] (ref 5–8)
PROT UR-MCNC: 20 MG/DL
SP GR UR STRIP: 1.02 (ref 1–1.03)
UROBILINOGEN UR STRIP-ACNC: NORMAL

## 2025-06-25 PROCEDURE — 81001 URINALYSIS AUTO W/SCOPE: CPT

## 2025-06-25 PROCEDURE — 3078F DIAST BP <80 MM HG: CPT | Performed by: OBSTETRICS & GYNECOLOGY

## 2025-06-25 PROCEDURE — 3074F SYST BP LT 130 MM HG: CPT | Performed by: OBSTETRICS & GYNECOLOGY

## 2025-06-25 PROCEDURE — 99396 PREV VISIT EST AGE 40-64: CPT | Performed by: OBSTETRICS & GYNECOLOGY

## 2025-06-25 PROCEDURE — 99213 OFFICE O/P EST LOW 20 MIN: CPT | Performed by: OBSTETRICS & GYNECOLOGY

## 2025-06-25 NOTE — PROGRESS NOTES
Lianet Fan is a 64 year old female  Patient's last menstrual period was 2015.    Chief Complaint   Patient presents with    Gyn Exam     ANNUAL EXAM// POSSIBLE UTI// REDNESS POSSIBLE SCRAPE// IRRITATED   .       History of Present Illness  The patient presents with a burning sensation during urination and redness in the genital area.    She has been experiencing a burning sensation during urination since , particularly when urine contacts the labia. Although the burning has improved, it remains present. She also notes redness in the genital area, initially attributing it to shaving irritation. She shaved on Saturday using regular soap and a sensitive shaver, not a shaving gel. She also had intercourse immediately prior to the burning which could also be the cause.      She mentions recent intercourse and wonders if it could be related to her symptoms. She uses a lubricant called Ubrelube, which she likes, but sometimes feels she needs something more. She is unsure if it is silicone-based or water-based- valdez silicone based.    She has a history of fibroids in her uterus, which have not been bothersome. She has not had an ultrasound in years.    No pain above the pubic bone.        OBSTETRICS HISTORY:  OB History    Para Term  AB Living   2 1 1 0 1 1   SAB IAB Ectopic Multiple Live Births   1 0 0 0 1       GYNE HISTORY:   Pap Date: 24  Pap Result Notes: Neg Pap/HPV // Mammo 25 DIAG C-2 BENIGN // Dexa 3/26/21 Osteopenia  Follow Up Recommendation: 3/29/24 CAP      MEDICAL HISTORY:  Past Medical History:    Baker's cyst of knee    bilateral    Baker's cyst of knee    bilateral     Pure hypercholesterolemia    Unspecified essential hypertension    benign     History reviewed. No pertinent surgical history.      SOCIAL HISTORY:  Social History     Socioeconomic History    Marital status: Single   Tobacco Use    Smoking status: Never    Smokeless tobacco: Never   Vaping Use     Vaping status: Never Used   Substance and Sexual Activity    Alcohol use: Yes     Alcohol/week: 0.0 standard drinks of alcohol     Comment: Socially     Drug use: No    Sexual activity: Yes        FAMILY HISTORY:  Family History   Problem Relation Age of Onset    Hypertension Father     Hypertension Mother     Heart Attack Mother        MEDICATIONS:    Current Outpatient Medications:     amLODIPine 10 MG Oral Tab, Take 1 tablet (10 mg total) by mouth daily., Disp: 90 tablet, Rfl: 1    atorvastatin 20 MG Oral Tab, Take 1 tablet (20 mg total) by mouth nightly., Disp: 90 tablet, Rfl: 0    losartan 50 MG Oral Tab, Take 2 tablets (100 mg total) by mouth daily., Disp: 180 tablet, Rfl: 0    metFORMIN 500 MG Oral Tab, Take 1 tablet (500 mg total) by mouth 2 (two) times daily., Disp: 180 tablet, Rfl: 0    metoprolol succinate ER 50 MG Oral Tablet 24 Hr, Take 1 tablet (50 mg total) by mouth daily., Disp: 90 tablet, Rfl: 0    diclofenac 50 MG Oral Tab EC, Take 1 tablet (50 mg total) by mouth as needed., Disp: 30 tablet, Rfl: 3    fluticasone furoate-vilanterol (BREO ELLIPTA) 200-25 MCG/ACT Inhalation Aerosol Powder, Breath Activated, Inhale 1 puff into the lungs daily., Disp: 1 each, Rfl: 0    ASPIRIN LOW DOSE 81 MG Oral Tab EC, TAKE 1 TABLET BY MOUTH DAILY, Disp: 30 tablet, Rfl: 0    ALLERGIES:    Allergies   Allergen Reactions    Ace Inhibitors        Blood pressure 127/76, pulse 62, weight 174 lb (78.9 kg), last menstrual period 06/12/2015, not currently breastfeeding.    Review of Systems:  Constitutional:  Denies fatigue, night sweats, hot flashes  Eyes:  denies blurred or double vision  Cardiovascular:  denies chest pain or palpitations  Respiratory:  denies shortness of breath  Gastrointestinal:  denies heartburn, abdominal pain, diarrhea or constipation  Genitourinary:  denies dysuria, incontinence, abnormal vaginal discharge, vaginal itching  Musculoskeletal:  denies back pain.  Skin/Breast:  Denies any breast  pain, lumps, or discharge.   Neurological:  denies headaches, extremity weakness or numbness.  Psychiatric: denies depression or anxiety.  Endocrine:   denies excessive thirst or urination.  Heme/Lymph:  denies history of anemia, easy bruising or bleeding.    Depression Screening (PHQ-2/PHQ-9): Over the LAST 2 WEEKS   Little interest or pleasure in doing things (over the last two weeks)?: Not at all    Feeling down, depressed, or hopeless (over the last two weeks)?: Not at all    PHQ-2 SCORE: 0           PHYSICAL EXAM:   Constitutional: well developed, well nourished  Head/Face: normocephalic  Neck/Thyroid: thyroid symmetric, no thyromegaly, no nodules, no adenopathy  Lymphatic:no abnormal supraclavicular or axillary adenopathy is noted  Breast: normal without palpable masses, tenderness, asymmetry, nipple discharge, nipple retraction or skin changes  Respiratory:  lungs clear to auscultation bilaterally  Cardiovascular: regular rate and rhythm, no significant murmur  Abdomen:  soft, nontender, nondistended, no masses  Skin/Hair: no unusual rashes or bruises  Extremities: no edema, no cyanosis  Psychiatric:  Oriented to time, place, person and situation. Appropriate mood and affect    Pelvic Exam:  External Genitalia: normal appearance, hair distribution, and no lesions  Urethral Meatus:  normal in size, location, without prolapse, small erythematous skin excoriation at 11 O'Clock- healing- recommended that she use a silicone based lubricant in the future as this appears to be a helaing fissure most likely due to the friction of intercourse  Bladder:  No fullness, masses or tenderness  Vagina:  Normal appearance without lesions, no abnormal discharge  Cervix:  Normal without tenderness on motion  Uterus: 14 weeks size contour, position, mobility, without tenderness  Adnexa: normal without masses or tenderness  Perineum: normal  Anus: no hemorroids     Results  RADIOLOGY  Mammogram: Normal (03/2025)       Assessment  & Plan:    Encounter Diagnoses   Name Primary?    Encounter for gynecological examination without abnormal finding     Dysuria Yes    Vulvar burning     Uterine leiomyoma, unspecified location     Visit for screening mammogram        Assessment & Plan  Urethral Irritation  Suspected urethral irritation from shaving or UTI. Redness atypical for UTI. Healing observed.  - Order urine test for UTI.  - Advise Vaseline for soothing and protection.  - Recommend shaving gel for sensitive skin and dedicated razor.  - Ensure lubricant is silicone-based.    Uterine Fibroid  Asymptomatic fibroid, size of 14-15 week pregnancy. Monitoring for growth necessary.  - Order pelvic ultrasound to assess fibroid size.    General Health Maintenance  Mammogram completed. Pap smear not required. Emphasized sunscreen use due to skin sensitivity.  - Order mammogram for April next year.  - Advise regular sunscreen reapplication.    Follow-up  Follow up on urine test results for UTI presence.  - Call office for urine test results.  - Follow up with on-call doctor if UTI is indicated.     SBE encouraged  Orders Placed This Encounter   Procedures    Urinalysis with Culture Reflex       Requested Prescriptions      No prescriptions requested or ordered in this encounter       US PELVIS W EV (CPT=76856/13259)  SARAH RYAN 2D+3D SCREENING BILAT (CPT=77067/38446)

## 2025-06-25 NOTE — TELEPHONE ENCOUNTER
This RN spoke with Dr. Phillips per Dr. Phillips negative u/a patient has a small laceration from intercourse can use Vaseline as recommended by Dr. Phillips to the area , patient aware of above information.

## 2025-06-25 NOTE — TELEPHONE ENCOUNTER
Patient seen today with Dr. Phillips     Patient called due to abnormal test results from UA done today, UA showed protein in urine.  Patient questioning why protein in urine. Patient still complains of increased frequency with urination and burning with urination as she had earlier today at appointment with Dr. Phillips.  Patient aware RN will let Dr. Phillips know results of U/A are resulted and will review with Dr. Phillips.      To Dr. Phillips for recommendations

## 2025-06-25 NOTE — PROGRESS NOTES
The following individual(s) verbally consented to be recorded using ambient AI listening technology and understand that they can each withdraw their consent to this listening technology at any point by asking the clinician to turn off or pause the recording:    Patient name: Lianet Fan

## 2025-08-12 DIAGNOSIS — E78.00 PURE HYPERCHOLESTEROLEMIA: Chronic | ICD-10-CM

## 2025-08-12 DIAGNOSIS — I10 ESSENTIAL HYPERTENSION, BENIGN: Chronic | ICD-10-CM

## 2025-08-12 DIAGNOSIS — E11.9 TYPE 2 DIABETES MELLITUS WITHOUT COMPLICATION, WITHOUT LONG-TERM CURRENT USE OF INSULIN (HCC): Chronic | ICD-10-CM

## 2025-08-13 RX ORDER — METOPROLOL SUCCINATE 50 MG/1
50 TABLET, EXTENDED RELEASE ORAL DAILY
Qty: 90 TABLET | Refills: 1 | Status: SHIPPED | OUTPATIENT
Start: 2025-08-13

## 2025-08-13 RX ORDER — AMLODIPINE BESYLATE 10 MG/1
10 TABLET ORAL DAILY
Qty: 90 TABLET | Refills: 1 | Status: SHIPPED | OUTPATIENT
Start: 2025-08-13

## 2025-08-13 RX ORDER — ATORVASTATIN CALCIUM 20 MG/1
20 TABLET, FILM COATED ORAL NIGHTLY
Qty: 90 TABLET | Refills: 1 | Status: SHIPPED | OUTPATIENT
Start: 2025-08-13

## 2025-08-13 RX ORDER — LOSARTAN POTASSIUM 50 MG/1
100 TABLET ORAL DAILY
Qty: 180 TABLET | Refills: 1 | Status: SHIPPED | OUTPATIENT
Start: 2025-08-13

## 2025-08-14 ENCOUNTER — TELEPHONE (OUTPATIENT)
Dept: INTERNAL MEDICINE CLINIC | Facility: CLINIC | Age: 65
End: 2025-08-14

## (undated) DIAGNOSIS — I10 ESSENTIAL HYPERTENSION, BENIGN: Chronic | ICD-10-CM

## (undated) NOTE — LETTER
Date: 7/22/2020    Patient Name: Tuan Charles          To Whom It May Concern: The above patient was seen at the [facility] for treatment of a medical condition.     The patient may return to work on 07/27/2020 with the following limitati

## (undated) NOTE — LETTER
05/13/19        [address] [id number]      Dear Myla Kim records indicate that you have outstanding lab work and or testing that was ordered for you and has not yet been completed: Mammogram/Bone Density - Please co

## (undated) NOTE — LETTER
03/03/21        [address] [place], [place] [address] [place] [address]      Dear Twan Mendes records indicate that you have outstanding lab work and or testing that was ordered for you and has not yet been completed:  Orders Placed This Encounter

## (undated) NOTE — LETTER
ASTHMA ACTION PLAN for Ann Saha     : 1960     Date: 2/3/2021  Provider:  Jerri Hendricks MD  Phone for doctor or clinic: [facility], [address] [facility] [place] [facility]", [address]  [address] [id number] [phone]    [facility]

## (undated) NOTE — LETTER
01/06/21    Gilberto De Santiago   8/23/1960           To Whom It May Concern:     Dennie Minerva, YOB: 1960 was tested on 11- for COVID-19. The results of that test were positive.      This patient should be excused from attending work until

## (undated) NOTE — LETTER
Date: 1/26/2023    Patient Name: Luis Myles          To Whom it may concern: This letter has been written at the patient's request. The above patient was seen at the [facility] for treatment of a medical condition. This patient should be excused from attending work/school. The patient may return to work/school on Monday January 30th 2023.          Sincerely,        Byron Lacey MD

## (undated) NOTE — LETTER
Date: 11/4/2020    Patient Name: Jamal Knows    To Whom it may concern,    This letter has been written at the patient's request. Due to the COVID-19 pandemic and in conjunction with the CDC's recommendations to reduce the exposure and risk of infection

## (undated) NOTE — LETTER
Date: 2/12/2020    Patient Name: Monique Mackey          To Whom it may concern: This letter has been written at the patient's request. The above patient was seen at the [facility] for treatment of a medical condition.     This patient shou

## (undated) NOTE — LETTER
11/02/21        [address]  [address] [place] [id number]      Dear Larry Castro records indicate that you have outstanding lab work and or testing that was ordered for you and has not yet been completed:  Orders Placed This Encounter

## (undated) NOTE — LETTER
Date: 12/3/2020    Patient Name: Dio Peguero          To Whom it may concern: This letter has been written at the patient's request. The above patient was seen at the [facility] for treatment of a medical condition.     This patient ashu

## (undated) NOTE — LETTER
Date: 3/13/2018    Patient Name: Windy Aguirre          To Whom it may concern: This letter has been written at the patient's request. The above patient was seen at the [facility] for treatment of a medical condition.     This patient ashu

## (undated) NOTE — LETTER
02/12/20    Dio Peguero      To Whom It May Concern: This letter has been written at the patient's request. The above patient was seen at [facility] for treatment of a medical condition.     The patient may return to work on *** with the followin

## (undated) NOTE — LETTER
Date: 3/11/2021    Patient Name: Monique Mackey          To Whom it may concern: The above patient was seen at the [facility] for treatment of a medical condition. The patient may return to work on 03/18/2021 with no limitations.

## (undated) NOTE — LETTER
Date: 3/11/2021    Patient Name: Heather Kumar          To Whom it may concern: The above patient was seen at the [facility] for treatment of a medical condition. The patient may return to work on 03/15/2021 with no restrictions.

## (undated) NOTE — LETTER
Patient Name: Javier Mendoza  : 1960  MRN: [id number]  Patient Address: [address] [place] [id number]      Coronavirus Disease 2019 (COVID-19)     [facility] is committed to the safety and well-being of our patients, members, carefully. If your symptoms get worse, call your healthcare provider immediately. 3. Get rest and stay hydrated.    4. If you have a medical appointment, call the healthcare provider ahead of time and tell them that you have or may have COVID-19.  5. For m of fever-reducing medications; and  · Improvement in respiratory symptoms (e.g., cough, shortness of breath); and  · At least 10 days have passed since symptoms first appeared OR if asymptomatic patient or date of symptom onset is unclear then use 10 days donors must:    · Have had a confirmed diagnosis of COVID-19  · Be symptom-free for at least 14 days*    *Some people will be required to have a repeat COVID-19 test in order to be eligible to donate.  If you’re instructed by [facility] that a repeat test is r random. Researchers are trying to identify similarities between people with a Post-COVID condition to better understand if there are risk factors. How do I prevent a Post-COVID condition?   The best way to prevent the long-term symptoms of COVID-19 is

## (undated) NOTE — LETTER
Date: 4/2/2021    Patient Name: Alli Mosley          To Whom it may concern: This letter has been written at the patient's request. The above patient was seen at the [place] [facility] for treatment of a medical condition.     The patient may re

## (undated) NOTE — LETTER
Date: 2/16/2018    Patient Name: Janak Matias          To Whom it may concern: This letter has been written at the patient's request. The above patient was seen at the [facility] for treatment of a medical condition.     This patient shou

## (undated) NOTE — LETTER
Date: 2/10/2025        Patient Name: Lianet Fan          To Whom it may concern:    This letter has been written at the patient's request. The above patient was seen at [facility].    .            Sincerely,            Kenneth Batres MD

## (undated) NOTE — MR AVS SNAPSHOT
After Visit Summary   3/29/2024    Lianet Fan   MRN: [id number]           Visit Information     Date & Time  3/29/2024  9:20 AM Provider  Joanna Phillips MD [facility] - OB/GYN Dept. Phone  [phone]      Your Vitals Were  Most recent update: 3/29/2024  9:33 AM    BP   133/74    Pulse   76    Wt   186 lb 6.4 oz    LMP   06/12/2015    BMI   30.09 kg/m²         Allergies as of 3/29/2024  Review status set to Review Complete on 3/29/2024       Noted Reaction Type Reactions    Ace Inhibitors 08/14/2014          Your Current Medications        Dosage    atorvastatin 20 MG Oral Tab Take 1 tablet (20 mg total) by mouth nightly.    losartan 50 MG Oral Tab Take 2 tablets (100 mg total) by mouth daily.    METFORMIN 500 MG Oral Tab TAKE 1 TABLET(500 MG) BY MOUTH TWICE DAILY    AMLODIPINE 10 MG Oral Tab TAKE 1 TABLET(10 MG) BY MOUTH DAILY    metoprolol succinate ER 50 MG Oral Tablet 24 Hr Take 1 tablet (50 mg total) by mouth daily.    fluticasone furoate-vilanterol (BREO ELLIPTA) 200-25 MCG/ACT Inhalation Aerosol Powder, Breath Activated Inhale 1 puff into the lungs daily.    diclofenac 50 MG Oral Tab EC Take 1 tablet (50 mg total) by mouth 2 (two) times daily.    ASPIRIN LOW DOSE 81 MG Oral Tab EC TAKE 1 TABLET BY MOUTH DAILY      Diagnoses for This Visit    Encounter for gynecological examination without abnormal finding   [938223]  -  Primary  Breast pain   [969887]    Enlarged uterus   [353187]    History of uterine fibroid   [896232]    Screening for cervical cancer   [659287]             We Ordered the Following     Normal Orders This Visit    Hpv Dna  High Risk , Thin Prep Collect [SDD7704 CUSTOM]     THINPREP PAP SMEAR ONLY [TYY4830 CUSTOM]     ThinPrep PAP Smear [[id number] CUSTOM]     Future Labs/Procedures Expected by Expires    Hpv Dna  High Risk , Thin Prep Collect [[id number] CUSTOM]  3/29/2024 3/29/2025    ThinPrep PAP Smear [[id number] CUSTOM]   3/29/2024 3/29/2025    US PELVIS W EV (CPT=76856/32096) [COMBO CPT(R)]  3/29/2024 (Approximate) 3/29/2025      Future Appointments        Provider Department    4/15/2024 3:45 PM [facility] 08 NURSE [facility]    4/20/2024 1:00 PM [facility] US [address] Ultrasound - [place]    5/16/2024 5:00 PM Kenneth Batres [facility]      Imaging Scheduling Instructions     Around March 29, 2024   Imaging:   US PELVIS W EV (CPT=76856/51262)    Instructions: To schedule an appointment for your radiology test please call [facility] Central Scheduling at [phone].                    Did you know that [facility] primary care physicians now offer Video Visits through [employer] for adult patients for a variety of conditions such as allergies, back pain and cold symptoms? Skip the drive and waiting room and online chat with a doctor face-to-face using your web-cam enabled computer or mobile device wherever you are. Video Visits cost $50 and can be paid hassle-free using a credit, debit, or health savings card.  Not active on [employer]? Ask us how to get signed up today!          If you receive a survey from [employer], please take a few minutes to complete it and provide feedback. We strive to deliver the best patient experience and are looking for ways to make improvements. Your feedback will help us do so. For more information on [employer], please visit [email or web address]           No text in SmartText           No text in SmartText

## (undated) NOTE — LETTER
Date: 11/23/2020    Patient Name: Bhakti Colón          To Whom it may concern: The above patient was seen at the [facility] for treatment of a medical condition.     This patient should be excused from attending work until further [employer]

## (undated) NOTE — LETTER
December 9, 2020      To Whom It May Concern:     Kelsie La, YOB: 1960 was tested on 11- for COVID-19. The results of that test were positive. This patient should be excused from attending work until further evaluation.     Pl

## (undated) NOTE — LETTER
Date: 12/1/2020    Patient Name: Bhakti Colón          To Whom it may concern,     This letter has been written at the patient's request. To inform Ms Rickie Taylor has been a long standing patient of mine and has tested POSITIVE for Covid-19.       In conjunct

## (undated) NOTE — LETTER
12/28/20    Alisia Dates   8/23/1960           To Whom It May Concern:     Loc Hare, YOB: 1960 was tested on 11- for COVID-19. The results of that test were positive.      This patient should be excused from attending work until

## (undated) NOTE — LETTER
09/17/18        [address]  [place] [id number]      Dear Heavenly Selby records indicate that you have outstanding lab work and or testing that was ordered for you and has not yet been completed:  Orders Placed This Encounter